# Patient Record
Sex: MALE | Race: WHITE | NOT HISPANIC OR LATINO | Employment: UNEMPLOYED | URBAN - METROPOLITAN AREA
[De-identification: names, ages, dates, MRNs, and addresses within clinical notes are randomized per-mention and may not be internally consistent; named-entity substitution may affect disease eponyms.]

---

## 2018-12-14 ENCOUNTER — HOSPITAL ENCOUNTER (INPATIENT)
Facility: HOSPITAL | Age: 23
LOS: 2 days | Discharge: LEFT BEFORE DISCHARGE | DRG: 286 | End: 2018-12-16
Attending: EMERGENCY MEDICINE | Admitting: INTERNAL MEDICINE

## 2018-12-14 DIAGNOSIS — F43.20 ADJUSTMENT DISORDER, UNSPECIFIED TYPE: ICD-10-CM

## 2018-12-14 DIAGNOSIS — R10.9 ABDOMINAL PAIN: ICD-10-CM

## 2018-12-14 DIAGNOSIS — I46.9 SUDDEN CARDIAC ARREST: ICD-10-CM

## 2018-12-14 DIAGNOSIS — Z98.890 REQUIRED EMERGENCY INTUBATION: ICD-10-CM

## 2018-12-14 DIAGNOSIS — I47.21 TORSADES DE POINTES: Primary | ICD-10-CM

## 2018-12-14 DIAGNOSIS — F12.10 CANNABIS ABUSE: Chronic | ICD-10-CM

## 2018-12-14 PROBLEM — R10.84 GENERALIZED ABDOMINAL PAIN: Status: ACTIVE | Noted: 2018-12-14

## 2018-12-14 PROBLEM — R73.9 HYPERGLYCEMIA: Status: ACTIVE | Noted: 2018-12-14

## 2018-12-14 PROBLEM — D72.829 LEUKOCYTOSIS: Status: ACTIVE | Noted: 2018-12-14

## 2018-12-14 PROBLEM — E87.20 LACTIC ACIDOSIS: Status: ACTIVE | Noted: 2018-12-14

## 2018-12-14 PROBLEM — J96.02 ACUTE RESPIRATORY FAILURE WITH HYPERCAPNIA: Status: ACTIVE | Noted: 2018-12-14

## 2018-12-14 LAB
ALBUMIN SERPL BCP-MCNC: 4.4 G/DL
ALBUMIN SERPL BCP-MCNC: 4.6 G/DL
ALLENS TEST: ABNORMAL
ALLENS TEST: ABNORMAL
ALP SERPL-CCNC: 120 U/L
ALP SERPL-CCNC: 122 U/L
ALT SERPL W/O P-5'-P-CCNC: 18 U/L
ALT SERPL W/O P-5'-P-CCNC: 78 U/L
AMPHET+METHAMPHET UR QL: NEGATIVE
AMPHET+METHAMPHET UR QL: NEGATIVE
ANION GAP SERPL CALC-SCNC: 10 MMOL/L
ANION GAP SERPL CALC-SCNC: 14 MMOL/L
ASCENDING AORTA: 2.28 CM
AST SERPL-CCNC: 20 U/L
AST SERPL-CCNC: 97 U/L
AV INDEX (PROSTH): 0.59
AV MEAN GRADIENT: 2.18 MMHG
AV PEAK GRADIENT: 3.92 MMHG
AV VALVE AREA: 2.44 CM2
BACTERIA #/AREA URNS AUTO: ABNORMAL /HPF
BARBITURATES UR QL SCN>200 NG/ML: NEGATIVE
BARBITURATES UR QL SCN>200 NG/ML: NEGATIVE
BASOPHILS # BLD AUTO: 0.09 K/UL
BASOPHILS NFR BLD: 0.5 %
BENZODIAZ UR QL SCN>200 NG/ML: NORMAL
BENZODIAZ UR QL SCN>200 NG/ML: NORMAL
BILIRUB SERPL-MCNC: 1 MG/DL
BILIRUB SERPL-MCNC: 1.2 MG/DL
BILIRUB UR QL STRIP: NEGATIVE
BSA FOR ECHO PROCEDURE: 1.89 M2
BUN SERPL-MCNC: 14 MG/DL
BUN SERPL-MCNC: 15 MG/DL
BUN SERPL-MCNC: 17 MG/DL (ref 6–30)
BUN SERPL-MCNC: 18 MG/DL (ref 6–30)
BZE UR QL SCN: NORMAL
BZE UR QL SCN: NORMAL
CALCIUM SERPL-MCNC: 10.3 MG/DL
CALCIUM SERPL-MCNC: 11 MG/DL
CANNABINOIDS UR QL SCN: NORMAL
CANNABINOIDS UR QL SCN: NORMAL
CHLORIDE SERPL-SCNC: 106 MMOL/L
CHLORIDE SERPL-SCNC: 107 MMOL/L (ref 95–110)
CHLORIDE SERPL-SCNC: 108 MMOL/L
CHLORIDE SERPL-SCNC: 111 MMOL/L (ref 95–110)
CLARITY UR REFRACT.AUTO: ABNORMAL
CO2 SERPL-SCNC: 21 MMOL/L
CO2 SERPL-SCNC: 23 MMOL/L
COLOR UR AUTO: YELLOW
CREAT SERPL-MCNC: 0.9 MG/DL (ref 0.5–1.4)
CREAT SERPL-MCNC: 1 MG/DL (ref 0.5–1.4)
CREAT SERPL-MCNC: 1.2 MG/DL
CREAT SERPL-MCNC: 1.2 MG/DL
CREAT UR-MCNC: 24 MG/DL
CREAT UR-MCNC: 24 MG/DL
CV ECHO LV RWT: 0.34 CM
DELSYS: ABNORMAL
DIFFERENTIAL METHOD: ABNORMAL
DOP CALC AO PEAK VEL: 0.99 M/S
DOP CALC AO VTI: 18.82 CM
DOP CALC LVOT AREA: 4.15 CM2
DOP CALC LVOT DIAMETER: 2.3 CM
DOP CALC LVOT STROKE VOLUME: 46.01 CM3
DOP CALCLVOT PEAK VEL VTI: 11.08 CM
E WAVE DECELERATION TIME: 192.28 MSEC
E/A RATIO: 1.59
E/E' RATIO: 6.56
ECHO LV POSTERIOR WALL: 0.95 CM (ref 0.6–1.1)
EOSINOPHIL # BLD AUTO: 0 K/UL
EOSINOPHIL NFR BLD: 0.1 %
ERYTHROCYTE [DISTWIDTH] IN BLOOD BY AUTOMATED COUNT: 13.2 %
ERYTHROCYTE [SEDIMENTATION RATE] IN BLOOD BY WESTERGREN METHOD: 14 MM/H
EST. GFR  (AFRICAN AMERICAN): >60 ML/MIN/1.73 M^2
EST. GFR  (AFRICAN AMERICAN): >60 ML/MIN/1.73 M^2
EST. GFR  (NON AFRICAN AMERICAN): >60 ML/MIN/1.73 M^2
EST. GFR  (NON AFRICAN AMERICAN): >60 ML/MIN/1.73 M^2
ETHANOL UR-MCNC: <10 MG/DL
FIO2: 50
FRACTIONAL SHORTENING: 13 % (ref 28–44)
GLUCOSE SERPL-MCNC: 121 MG/DL
GLUCOSE SERPL-MCNC: 185 MG/DL
GLUCOSE SERPL-MCNC: 193 MG/DL (ref 70–110)
GLUCOSE SERPL-MCNC: 295 MG/DL (ref 70–110)
GLUCOSE UR QL STRIP: ABNORMAL
HCO3 UR-SCNC: 10.5 MMOL/L (ref 24–28)
HCO3 UR-SCNC: 18.3 MMOL/L (ref 24–28)
HCT VFR BLD AUTO: 44.4 %
HCT VFR BLD CALC: 46 %PCV (ref 36–54)
HCT VFR BLD CALC: 49 %PCV (ref 36–54)
HCT VFR BLD CALC: 54 %PCV (ref 36–54)
HGB BLD-MCNC: 14.9 G/DL
HGB UR QL STRIP: ABNORMAL
HYALINE CASTS UR QL AUTO: 0 /LPF
IMM GRANULOCYTES # BLD AUTO: 0.08 K/UL
IMM GRANULOCYTES NFR BLD AUTO: 0.5 %
INTERVENTRICULAR SEPTUM: 0.92 CM (ref 0.6–1.1)
KETONES UR QL STRIP: ABNORMAL
LA MAJOR: 5.07 CM
LA MINOR: 4.84 CM
LA WIDTH: 3.56 CM
LACTATE SERPL-SCNC: 1 MMOL/L
LACTATE SERPL-SCNC: 3.2 MMOL/L
LDH SERPL L TO P-CCNC: 9.64 MMOL/L (ref 0.5–2.2)
LEFT ATRIUM SIZE: 2.45 CM
LEFT ATRIUM VOLUME INDEX: 19.3 ML/M2
LEFT ATRIUM VOLUME: 36.72 CM3
LEFT INTERNAL DIMENSION IN SYSTOLE: 4.86 CM (ref 2.1–4)
LEFT VENTRICLE DIASTOLIC VOLUME INDEX: 79.61 ML/M2
LEFT VENTRICLE DIASTOLIC VOLUME: 151.14 ML
LEFT VENTRICLE MASS INDEX: 104.7 G/M2
LEFT VENTRICLE SYSTOLIC VOLUME INDEX: 58.2 ML/M2
LEFT VENTRICLE SYSTOLIC VOLUME: 110.52 ML
LEFT VENTRICULAR INTERNAL DIMENSION IN DIASTOLE: 5.56 CM (ref 3.5–6)
LEFT VENTRICULAR MASS: 198.86 G
LEUKOCYTE ESTERASE UR QL STRIP: NEGATIVE
LIPASE SERPL-CCNC: 10 U/L
LV LATERAL E/E' RATIO: 5.36
LV SEPTAL E/E' RATIO: 8.43
LYMPHOCYTES # BLD AUTO: 1.4 K/UL
LYMPHOCYTES NFR BLD: 8.2 %
MAGNESIUM SERPL-MCNC: 1.7 MG/DL
MCH RBC QN AUTO: 28.7 PG
MCHC RBC AUTO-ENTMCNC: 33.6 G/DL
MCV RBC AUTO: 86 FL
METHADONE UR QL SCN>300 NG/ML: NEGATIVE
METHADONE UR QL SCN>300 NG/ML: NEGATIVE
MICROSCOPIC COMMENT: ABNORMAL
MODE: ABNORMAL
MONOCYTES # BLD AUTO: 1.1 K/UL
MONOCYTES NFR BLD: 6.2 %
MV PEAK A VEL: 0.37 M/S
MV PEAK E VEL: 0.59 M/S
NEUTROPHILS # BLD AUTO: 14.7 K/UL
NEUTROPHILS NFR BLD: 84.5 %
NITRITE UR QL STRIP: NEGATIVE
NRBC BLD-RTO: 0 /100 WBC
OPIATES UR QL SCN: NORMAL
OPIATES UR QL SCN: NORMAL
PCO2 BLDA: 17.3 MMHG (ref 35–45)
PCO2 BLDA: 44.5 MMHG (ref 35–45)
PCP UR QL SCN>25 NG/ML: NEGATIVE
PCP UR QL SCN>25 NG/ML: NEGATIVE
PEEP: 5
PH SMN: 7.22 [PH] (ref 7.35–7.45)
PH SMN: 7.39 [PH] (ref 7.35–7.45)
PH UR STRIP: 6 [PH] (ref 5–8)
PHOSPHATE SERPL-MCNC: 1.2 MG/DL
PISA TR MAX VEL: 1.58 M/S
PLATELET # BLD AUTO: 508 K/UL
PMV BLD AUTO: 9.5 FL
PO2 BLDA: 127 MMHG (ref 40–60)
PO2 BLDA: 397 MMHG (ref 80–100)
POC BE: -14 MMOL/L
POC BE: -9 MMOL/L
POC IONIZED CALCIUM: 0.94 MMOL/L (ref 1.06–1.42)
POC IONIZED CALCIUM: 1.48 MMOL/L (ref 1.06–1.42)
POC IONIZED CALCIUM: 1.94 MMOL/L (ref 1.06–1.42)
POC SATURATED O2: 100 % (ref 95–100)
POC SATURATED O2: 99 % (ref 95–100)
POC TCO2 (MEASURED): 14 MMOL/L (ref 23–29)
POC TCO2 (MEASURED): 20 MMOL/L (ref 23–29)
POC TCO2: 11 MMOL/L (ref 24–29)
POC TCO2: 20 MMOL/L (ref 23–27)
POCT GLUCOSE: 112 MG/DL (ref 70–110)
POCT GLUCOSE: 132 MG/DL (ref 70–110)
POTASSIUM BLD-SCNC: 3.7 MMOL/L (ref 3.5–5.1)
POTASSIUM BLD-SCNC: 4 MMOL/L (ref 3.5–5.1)
POTASSIUM BLD-SCNC: 6 MMOL/L (ref 3.5–5.1)
POTASSIUM SERPL-SCNC: 3.9 MMOL/L
POTASSIUM SERPL-SCNC: 4.4 MMOL/L
PROT SERPL-MCNC: 8.4 G/DL
PROT SERPL-MCNC: 8.8 G/DL
PROT UR QL STRIP: ABNORMAL
PULM VEIN S/D RATIO: 1.05
PV PEAK D VEL: 0.42 M/S
PV PEAK S VEL: 0.44 M/S
RA MAJOR: 4.38 CM
RA WIDTH: 3.62 CM
RBC # BLD AUTO: 5.19 M/UL
RBC #/AREA URNS AUTO: 4 /HPF (ref 0–4)
RIGHT VENTRICULAR END-DIASTOLIC DIMENSION: 3.12 CM
RV TISSUE DOPPLER FREE WALL SYSTOLIC VELOCITY 1 (APICAL 4 CHAMBER VIEW): 7.87 M/S
SAMPLE: ABNORMAL
SINUS: 3.01 CM
SITE: ABNORMAL
SITE: ABNORMAL
SODIUM BLD-SCNC: 139 MMOL/L (ref 136–145)
SODIUM BLD-SCNC: 142 MMOL/L (ref 136–145)
SODIUM BLD-SCNC: 143 MMOL/L (ref 136–145)
SODIUM SERPL-SCNC: 141 MMOL/L
SODIUM SERPL-SCNC: 141 MMOL/L
SP GR UR STRIP: 1.02 (ref 1–1.03)
SP02: 100
STJ: 2.57 CM
TDI LATERAL: 0.11
TDI SEPTAL: 0.07
TDI: 0.09
TOXICOLOGY INFORMATION: NORMAL
TOXICOLOGY INFORMATION: NORMAL
TR MAX PG: 9.99 MMHG
TRICUSPID ANNULAR PLANE SYSTOLIC EXCURSION: 1.37 CM
TROPONIN I SERPL DL<=0.01 NG/ML-MCNC: 0.25 NG/ML
TROPONIN I SERPL DL<=0.01 NG/ML-MCNC: 0.39 NG/ML
URN SPEC COLLECT METH UR: ABNORMAL
VT: 450
WBC # BLD AUTO: 17.38 K/UL
WBC #/AREA URNS AUTO: 1 /HPF (ref 0–5)
YEAST UR QL AUTO: ABNORMAL

## 2018-12-14 PROCEDURE — 51702 INSERT TEMP BLADDER CATH: CPT

## 2018-12-14 PROCEDURE — B2111ZZ FLUOROSCOPY OF MULTIPLE CORONARY ARTERIES USING LOW OSMOLAR CONTRAST: ICD-10-PCS | Performed by: INTERNAL MEDICINE

## 2018-12-14 PROCEDURE — C1769 GUIDE WIRE: HCPCS | Performed by: INTERNAL MEDICINE

## 2018-12-14 PROCEDURE — 84484 ASSAY OF TROPONIN QUANT: CPT

## 2018-12-14 PROCEDURE — 94761 N-INVAS EAR/PLS OXIMETRY MLT: CPT

## 2018-12-14 PROCEDURE — 99152 MOD SED SAME PHYS/QHP 5/>YRS: CPT | Performed by: INTERNAL MEDICINE

## 2018-12-14 PROCEDURE — 99152 MOD SED SAME PHYS/QHP 5/>YRS: CPT | Mod: ,,, | Performed by: INTERNAL MEDICINE

## 2018-12-14 PROCEDURE — 25000003 PHARM REV CODE 250: Performed by: INTERNAL MEDICINE

## 2018-12-14 PROCEDURE — 82803 BLOOD GASES ANY COMBINATION: CPT

## 2018-12-14 PROCEDURE — 25000003 PHARM REV CODE 250

## 2018-12-14 PROCEDURE — 63600175 PHARM REV CODE 636 W HCPCS: Performed by: EMERGENCY MEDICINE

## 2018-12-14 PROCEDURE — 63600175 PHARM REV CODE 636 W HCPCS

## 2018-12-14 PROCEDURE — 93458 L HRT ARTERY/VENTRICLE ANGIO: CPT | Mod: 26,,, | Performed by: INTERNAL MEDICINE

## 2018-12-14 PROCEDURE — C1760 CLOSURE DEV, VASC: HCPCS | Performed by: INTERNAL MEDICINE

## 2018-12-14 PROCEDURE — 25000003 PHARM REV CODE 250: Performed by: EMERGENCY MEDICINE

## 2018-12-14 PROCEDURE — 93005 ELECTROCARDIOGRAM TRACING: CPT

## 2018-12-14 PROCEDURE — 5A1945Z RESPIRATORY VENTILATION, 24-96 CONSECUTIVE HOURS: ICD-10-PCS | Performed by: INTERNAL MEDICINE

## 2018-12-14 PROCEDURE — 31500 INSERT EMERGENCY AIRWAY: CPT

## 2018-12-14 PROCEDURE — 36600 WITHDRAWAL OF ARTERIAL BLOOD: CPT

## 2018-12-14 PROCEDURE — 96365 THER/PROPH/DIAG IV INF INIT: CPT | Mod: 59

## 2018-12-14 PROCEDURE — 80053 COMPREHEN METABOLIC PANEL: CPT | Mod: 91

## 2018-12-14 PROCEDURE — 99291 CRITICAL CARE FIRST HOUR: CPT | Mod: 25,,, | Performed by: EMERGENCY MEDICINE

## 2018-12-14 PROCEDURE — 63600175 PHARM REV CODE 636 W HCPCS: Performed by: STUDENT IN AN ORGANIZED HEALTH CARE EDUCATION/TRAINING PROGRAM

## 2018-12-14 PROCEDURE — 85025 COMPLETE CBC W/AUTO DIFF WBC: CPT

## 2018-12-14 PROCEDURE — 82330 ASSAY OF CALCIUM: CPT

## 2018-12-14 PROCEDURE — 20000000 HC ICU ROOM

## 2018-12-14 PROCEDURE — 87040 BLOOD CULTURE FOR BACTERIA: CPT

## 2018-12-14 PROCEDURE — 4A023N7 MEASUREMENT OF CARDIAC SAMPLING AND PRESSURE, LEFT HEART, PERCUTANEOUS APPROACH: ICD-10-PCS | Performed by: INTERNAL MEDICINE

## 2018-12-14 PROCEDURE — 84484 ASSAY OF TROPONIN QUANT: CPT | Mod: 91

## 2018-12-14 PROCEDURE — 80307 DRUG TEST PRSMV CHEM ANLYZR: CPT

## 2018-12-14 PROCEDURE — 84295 ASSAY OF SERUM SODIUM: CPT

## 2018-12-14 PROCEDURE — 99291 CRITICAL CARE FIRST HOUR: CPT | Mod: 25

## 2018-12-14 PROCEDURE — 83690 ASSAY OF LIPASE: CPT

## 2018-12-14 PROCEDURE — B2151ZZ FLUOROSCOPY OF LEFT HEART USING LOW OSMOLAR CONTRAST: ICD-10-PCS | Performed by: INTERNAL MEDICINE

## 2018-12-14 PROCEDURE — 31500 INSERT EMERGENCY AIRWAY: CPT | Mod: ,,, | Performed by: EMERGENCY MEDICINE

## 2018-12-14 PROCEDURE — 51701 INSERT BLADDER CATHETER: CPT

## 2018-12-14 PROCEDURE — 93010 ELECTROCARDIOGRAM REPORT: CPT | Mod: 59,,, | Performed by: INTERNAL MEDICINE

## 2018-12-14 PROCEDURE — 5A2204Z RESTORATION OF CARDIAC RHYTHM, SINGLE: ICD-10-PCS | Performed by: EMERGENCY MEDICINE

## 2018-12-14 PROCEDURE — 96375 TX/PRO/DX INJ NEW DRUG ADDON: CPT | Mod: 59

## 2018-12-14 PROCEDURE — 82962 GLUCOSE BLOOD TEST: CPT

## 2018-12-14 PROCEDURE — 93458 L HRT ARTERY/VENTRICLE ANGIO: CPT | Performed by: INTERNAL MEDICINE

## 2018-12-14 PROCEDURE — 92960 CARDIOVERSION ELECTRIC EXT: CPT | Mod: ,,, | Performed by: EMERGENCY MEDICINE

## 2018-12-14 PROCEDURE — 83605 ASSAY OF LACTIC ACID: CPT

## 2018-12-14 PROCEDURE — 83605 ASSAY OF LACTIC ACID: CPT | Mod: 91

## 2018-12-14 PROCEDURE — 25000003 PHARM REV CODE 250: Performed by: STUDENT IN AN ORGANIZED HEALTH CARE EDUCATION/TRAINING PROGRAM

## 2018-12-14 PROCEDURE — 94002 VENT MGMT INPAT INIT DAY: CPT

## 2018-12-14 PROCEDURE — 27100171 HC OXYGEN HIGH FLOW UP TO 24 HOURS

## 2018-12-14 PROCEDURE — 99291 CRITICAL CARE FIRST HOUR: CPT | Mod: ,,, | Performed by: INTERNAL MEDICINE

## 2018-12-14 PROCEDURE — 80053 COMPREHEN METABOLIC PANEL: CPT

## 2018-12-14 PROCEDURE — 85014 HEMATOCRIT: CPT

## 2018-12-14 PROCEDURE — 63600175 PHARM REV CODE 636 W HCPCS: Performed by: INTERNAL MEDICINE

## 2018-12-14 PROCEDURE — 5A12012 PERFORMANCE OF CARDIAC OUTPUT, SINGLE, MANUAL: ICD-10-PCS | Performed by: EMERGENCY MEDICINE

## 2018-12-14 PROCEDURE — 81001 URINALYSIS AUTO W/SCOPE: CPT

## 2018-12-14 PROCEDURE — 83735 ASSAY OF MAGNESIUM: CPT

## 2018-12-14 PROCEDURE — 84132 ASSAY OF SERUM POTASSIUM: CPT

## 2018-12-14 PROCEDURE — C1894 INTRO/SHEATH, NON-LASER: HCPCS | Performed by: INTERNAL MEDICINE

## 2018-12-14 PROCEDURE — 99900035 HC TECH TIME PER 15 MIN (STAT)

## 2018-12-14 PROCEDURE — S0028 INJECTION, FAMOTIDINE, 20 MG: HCPCS | Performed by: STUDENT IN AN ORGANIZED HEALTH CARE EDUCATION/TRAINING PROGRAM

## 2018-12-14 PROCEDURE — 92960 CARDIOVERSION ELECTRIC EXT: CPT

## 2018-12-14 PROCEDURE — 0BH17EZ INSERTION OF ENDOTRACHEAL AIRWAY INTO TRACHEA, VIA NATURAL OR ARTIFICIAL OPENING: ICD-10-PCS | Performed by: EMERGENCY MEDICINE

## 2018-12-14 PROCEDURE — 84100 ASSAY OF PHOSPHORUS: CPT

## 2018-12-14 RX ORDER — VANCOMYCIN HCL IN 5 % DEXTROSE 1G/250ML
15 PLASTIC BAG, INJECTION (ML) INTRAVENOUS
Status: DISCONTINUED | OUTPATIENT
Start: 2018-12-15 | End: 2018-12-15

## 2018-12-14 RX ORDER — ETOMIDATE 2 MG/ML
INJECTION INTRAVENOUS
Status: COMPLETED
Start: 2018-12-14 | End: 2018-12-14

## 2018-12-14 RX ORDER — VANCOMYCIN 2 GRAM/500 ML IN 0.9 % SODIUM CHLORIDE INTRAVENOUS
2000
Status: COMPLETED | OUTPATIENT
Start: 2018-12-14 | End: 2018-12-14

## 2018-12-14 RX ORDER — LIDOCAINE HYDROCHLORIDE 20 MG/ML
INJECTION, SOLUTION INFILTRATION; PERINEURAL
Status: DISCONTINUED | OUTPATIENT
Start: 2018-12-14 | End: 2018-12-14 | Stop reason: HOSPADM

## 2018-12-14 RX ORDER — LORAZEPAM 2 MG/ML
2 INJECTION INTRAMUSCULAR ONCE
Status: COMPLETED | OUTPATIENT
Start: 2018-12-14 | End: 2018-12-15

## 2018-12-14 RX ORDER — CLOPIDOGREL 300 MG/1
600 TABLET, FILM COATED ORAL ONCE
Status: COMPLETED | OUTPATIENT
Start: 2018-12-14 | End: 2018-12-14

## 2018-12-14 RX ORDER — ASPIRIN 325 MG
TABLET ORAL
Status: COMPLETED
Start: 2018-12-14 | End: 2018-12-14

## 2018-12-14 RX ORDER — CALCIUM CHLORIDE INJECTION 100 MG/ML
1 INJECTION, SOLUTION INTRAVENOUS
Status: COMPLETED | OUTPATIENT
Start: 2018-12-14 | End: 2018-12-14

## 2018-12-14 RX ORDER — ASPIRIN 325 MG
325 TABLET ORAL ONCE
Status: COMPLETED | OUTPATIENT
Start: 2018-12-14 | End: 2018-12-14

## 2018-12-14 RX ORDER — MIDAZOLAM HYDROCHLORIDE 1 MG/ML
INJECTION, SOLUTION INTRAMUSCULAR; INTRAVENOUS
Status: DISCONTINUED | OUTPATIENT
Start: 2018-12-14 | End: 2018-12-14 | Stop reason: HOSPADM

## 2018-12-14 RX ORDER — FAMOTIDINE 10 MG/ML
20 INJECTION INTRAVENOUS 2 TIMES DAILY
Status: DISCONTINUED | OUTPATIENT
Start: 2018-12-14 | End: 2018-12-15

## 2018-12-14 RX ORDER — SODIUM CHLORIDE 0.9 G/100ML
IRRIGANT IRRIGATION
Status: DISCONTINUED | OUTPATIENT
Start: 2018-12-14 | End: 2018-12-14 | Stop reason: HOSPADM

## 2018-12-14 RX ORDER — ROCURONIUM BROMIDE 10 MG/ML
0.6 INJECTION, SOLUTION INTRAVENOUS
Status: COMPLETED | OUTPATIENT
Start: 2018-12-14 | End: 2018-12-14

## 2018-12-14 RX ORDER — ROCURONIUM BROMIDE 10 MG/ML
INJECTION, SOLUTION INTRAVENOUS
Status: DISPENSED
Start: 2018-12-14 | End: 2018-12-14

## 2018-12-14 RX ORDER — SODIUM CHLORIDE 9 MG/ML
INJECTION, SOLUTION INTRAVENOUS CONTINUOUS
Status: DISCONTINUED | OUTPATIENT
Start: 2018-12-14 | End: 2018-12-14

## 2018-12-14 RX ORDER — MAGNESIUM SULFATE HEPTAHYDRATE 40 MG/ML
2 INJECTION, SOLUTION INTRAVENOUS
Status: COMPLETED | OUTPATIENT
Start: 2018-12-14 | End: 2018-12-14

## 2018-12-14 RX ORDER — HEPARIN SODIUM 1000 [USP'U]/ML
INJECTION, SOLUTION INTRAVENOUS; SUBCUTANEOUS
Status: DISCONTINUED | OUTPATIENT
Start: 2018-12-14 | End: 2018-12-14 | Stop reason: HOSPADM

## 2018-12-14 RX ORDER — DIPHENHYDRAMINE HCL 50 MG
50 CAPSULE ORAL ONCE
Status: CANCELLED | OUTPATIENT
Start: 2018-12-14 | End: 2018-12-14

## 2018-12-14 RX ORDER — FENTANYL CITRATE 50 UG/ML
INJECTION, SOLUTION INTRAMUSCULAR; INTRAVENOUS
Status: DISCONTINUED | OUTPATIENT
Start: 2018-12-14 | End: 2018-12-14 | Stop reason: HOSPADM

## 2018-12-14 RX ORDER — ONDANSETRON 2 MG/ML
4 INJECTION INTRAMUSCULAR; INTRAVENOUS
Status: COMPLETED | OUTPATIENT
Start: 2018-12-14 | End: 2018-12-14

## 2018-12-14 RX ORDER — HEPARIN SODIUM 200 [USP'U]/100ML
INJECTION, SOLUTION INTRAVENOUS
Status: DISCONTINUED | OUTPATIENT
Start: 2018-12-14 | End: 2018-12-15

## 2018-12-14 RX ORDER — CEFEPIME HYDROCHLORIDE 2 G/1
2 INJECTION, POWDER, FOR SOLUTION INTRAVENOUS
Status: COMPLETED | OUTPATIENT
Start: 2018-12-14 | End: 2018-12-14

## 2018-12-14 RX ORDER — CLOPIDOGREL 300 MG/1
TABLET, FILM COATED ORAL
Status: COMPLETED
Start: 2018-12-14 | End: 2018-12-14

## 2018-12-14 RX ORDER — PROPOFOL 10 MG/ML
INJECTION, EMULSION INTRAVENOUS
Status: COMPLETED
Start: 2018-12-14 | End: 2018-12-14

## 2018-12-14 RX ORDER — LORAZEPAM 2 MG/ML
1 INJECTION INTRAMUSCULAR ONCE
Status: COMPLETED | OUTPATIENT
Start: 2018-12-14 | End: 2018-12-15

## 2018-12-14 RX ORDER — CHLORHEXIDINE GLUCONATE ORAL RINSE 1.2 MG/ML
15 SOLUTION DENTAL 2 TIMES DAILY
Status: DISCONTINUED | OUTPATIENT
Start: 2018-12-14 | End: 2018-12-16 | Stop reason: ALTCHOICE

## 2018-12-14 RX ORDER — SODIUM CHLORIDE 9 MG/ML
3 INJECTION, SOLUTION INTRAVENOUS CONTINUOUS
Status: CANCELLED | OUTPATIENT
Start: 2018-12-14 | End: 2018-12-14

## 2018-12-14 RX ORDER — NITROGLYCERIN 5 MG/ML
INJECTION, SOLUTION INTRAVENOUS
Status: DISCONTINUED | OUTPATIENT
Start: 2018-12-14 | End: 2018-12-14 | Stop reason: HOSPADM

## 2018-12-14 RX ORDER — AMIODARONE HYDROCHLORIDE 150 MG/3ML
300 INJECTION, SOLUTION INTRAVENOUS
Status: COMPLETED | OUTPATIENT
Start: 2018-12-14 | End: 2018-12-14

## 2018-12-14 RX ORDER — FENTANYL CITRATE-0.9 % NACL/PF 10 MCG/ML
PLASTIC BAG, INJECTION (ML) INTRAVENOUS CONTINUOUS
Status: DISCONTINUED | OUTPATIENT
Start: 2018-12-14 | End: 2018-12-15 | Stop reason: ALTCHOICE

## 2018-12-14 RX ORDER — DEXMEDETOMIDINE HYDROCHLORIDE 4 UG/ML
0.2 INJECTION, SOLUTION INTRAVENOUS CONTINUOUS
Status: DISCONTINUED | OUTPATIENT
Start: 2018-12-14 | End: 2018-12-15

## 2018-12-14 RX ORDER — PROPOFOL 10 MG/ML
10 INJECTION, EMULSION INTRAVENOUS CONTINUOUS
Status: DISCONTINUED | OUTPATIENT
Start: 2018-12-14 | End: 2018-12-15 | Stop reason: ALTCHOICE

## 2018-12-14 RX ADMIN — CLOPIDOGREL BISULFATE 600 MG: 300 TABLET, FILM COATED ORAL at 12:12

## 2018-12-14 RX ADMIN — PROPOFOL 50 MCG/KG/MIN: 10 INJECTION, EMULSION INTRAVENOUS at 04:12

## 2018-12-14 RX ADMIN — CHLORHEXIDINE GLUCONATE 15 ML: 1.2 RINSE ORAL at 10:12

## 2018-12-14 RX ADMIN — MAGNESIUM SULFATE IN WATER 2 G: 40 INJECTION, SOLUTION INTRAVENOUS at 11:12

## 2018-12-14 RX ADMIN — SODIUM CHLORIDE 3 ML/KG/HR: 0.9 INJECTION, SOLUTION INTRAVENOUS at 02:12

## 2018-12-14 RX ADMIN — PIPERACILLIN AND TAZOBACTAM 4.5 G: 4; .5 INJECTION, POWDER, LYOPHILIZED, FOR SOLUTION INTRAVENOUS; PARENTERAL at 10:12

## 2018-12-14 RX ADMIN — CEFEPIME 2 G: 2 INJECTION, POWDER, FOR SOLUTION INTRAVENOUS at 12:12

## 2018-12-14 RX ADMIN — MAGNESIUM SULFATE IN WATER 2 G: 40 INJECTION, SOLUTION INTRAVENOUS at 12:12

## 2018-12-14 RX ADMIN — CLOPIDOGREL BISULFATE: 300 TABLET, FILM COATED ORAL at 12:12

## 2018-12-14 RX ADMIN — ETOMIDATE 10 MG: 2 INJECTION INTRAVENOUS at 11:12

## 2018-12-14 RX ADMIN — CALCIUM CHLORIDE 1 G: 100 INJECTION, SOLUTION INTRAVENOUS at 11:12

## 2018-12-14 RX ADMIN — PIPERACILLIN AND TAZOBACTAM 4.5 G: 4; .5 INJECTION, POWDER, LYOPHILIZED, FOR SOLUTION INTRAVENOUS; PARENTERAL at 02:12

## 2018-12-14 RX ADMIN — VANCOMYCIN HYDROCHLORIDE 2000 MG: 10 INJECTION, POWDER, LYOPHILIZED, FOR SOLUTION INTRAVENOUS at 12:12

## 2018-12-14 RX ADMIN — AMIODARONE HYDROCHLORIDE 300 MG: 150 INJECTION, SOLUTION INTRAVENOUS at 11:12

## 2018-12-14 RX ADMIN — SODIUM CHLORIDE 1000 ML: 0.9 INJECTION, SOLUTION INTRAVENOUS at 11:12

## 2018-12-14 RX ADMIN — ASPIRIN 325 MG ORAL TABLET 325 MG: 325 PILL ORAL at 12:12

## 2018-12-14 RX ADMIN — Medication: at 12:12

## 2018-12-14 RX ADMIN — INSULIN HUMAN 10 UNITS: 100 INJECTION, SOLUTION PARENTERAL at 11:12

## 2018-12-14 RX ADMIN — SODIUM CHLORIDE, SODIUM LACTATE, POTASSIUM CHLORIDE, AND CALCIUM CHLORIDE 1000 ML: .6; .31; .03; .02 INJECTION, SOLUTION INTRAVENOUS at 11:12

## 2018-12-14 RX ADMIN — ONDANSETRON 4 MG: 2 INJECTION INTRAMUSCULAR; INTRAVENOUS at 10:12

## 2018-12-14 RX ADMIN — ROCURONIUM BROMIDE 44 MG: 10 INJECTION, SOLUTION INTRAVENOUS at 11:12

## 2018-12-14 RX ADMIN — DEXMEDETOMIDINE HYDROCHLORIDE 0.5 MCG/KG/HR: 100 INJECTION, SOLUTION, CONCENTRATE INTRAVENOUS at 04:12

## 2018-12-14 RX ADMIN — FAMOTIDINE 20 MG: 10 INJECTION INTRAVENOUS at 09:12

## 2018-12-14 RX ADMIN — PROPOFOL 50 MCG/KG/MIN: 10 INJECTION, EMULSION INTRAVENOUS at 08:12

## 2018-12-14 RX ADMIN — DEXTROSE MONOHYDRATE 25 G: 25 INJECTION, SOLUTION INTRAVENOUS at 11:12

## 2018-12-14 RX ADMIN — Medication 25 MCG/HR: at 06:12

## 2018-12-14 NOTE — H&P
"[facility]  Cardiology  History and Physical     Patient Name: Reggie To  MRN: [id number]  Admission Date: 12/14/2018  Code Status: No Order   Attending Provider: Polo Patel MD   Primary Care Physician: No primary care provider on file.  Principal Problem:Torsades de pointes    Patient information was obtained from spouse/SO, relative(s) and ER records.     Subjective:     Chief Complaint:  Abdominal pain     HPI:  Mr. To is a 23 year old male who initially presented to the ED with complaint of acute onset abdominal pain. Patient woke up at 6:30 AM today with severe abdominal pain and nausea. Per ED note, he had 20 episodes of vomiting and 10-15 episodes of diarrhea with dark red stool. At that time, patient reported 10/10 intensity pain described as generalized abdominal cramping. While patient was in triage, patient lost his conscious and he was pulseless. CPR was started, and he had ROSC after 15 minutes. He was intubated and sedated.     Patient was accompanied by his wife and family who provided the history. Per wife, patient's most recent meal was yesterday evening consisting of pizza. Last night around 11:30, he complained of his hands and legs feeling "numb". This morning, he vomited and had a fever of 104.1. He reported "don't feel too good" and asked his wife to call an ambulance. He has a history of PVCs since 2016, but his mother has reported that he was "born with it". He had been on medications for this, but this was stopped about 7-8 months ago. He also has a history of "bleeding ulcers" since 2015 due to EtOH use. This apparently happens "every 1-2 years". He is on pills for this. Wife states that patient has had ED visits in the past for similar symptoms and improvement with Zofran and GI cocktail. He was last hospitalized for this about 2 years ago. Wife also said that he is non-compliant with medications and does not like taking medications that are prescribed. " "Per aunt, he has been having "flu-like illness" for past few weeks. He smokes half a pack a of cigarettes per day for the past 4 years. He has not had recent EtOH use. He uses marijuana, but it is unknown how much and how often. Patient and family are originally from [place], and patient primarily receives his care there. Per family, patient is in [place] visiting family.    He received Zofran, IV fluids, and one dose of Vancomycin and Cefepime in the ED.  Interventional cardiology performed a [facility] which showed:  · TDP with cardiac arrest, CPR, ROSC, intubated.  · Normal coronaries  · Abdominal pain and fever, etiology yet determined.  · Estimated blood loss: none    Patient will be admitted to the CMICU for further evaluation and management.     History reviewed. No pertinent past medical history.    History reviewed. No pertinent surgical history.    Review of patient's allergies indicates:  No Known Allergies    No current facility-administered medications on file prior to encounter.      No current outpatient medications on file prior to encounter.     Family History     None        Tobacco Use    Smoking status: Current Every Day Smoker     Packs/day: 0.50     Types: Cigarettes    Smokeless tobacco: Never Used   Substance and Sexual Activity    Alcohol use: No     Frequency: Never    Drug use: No    Sexual activity: Not on file     Review of Systems   Unable to perform ROS: intubated     Objective:     Vital Signs (Most Recent):  Temp: 100.3 °F (37.9 °C) (12/14/18 1136)  Pulse: 67 (12/14/18 1522)  Resp: (!) 32 (12/14/18 1522)  BP: 131/70 (12/14/18 1522)  SpO2: 100 % (12/14/18 1522) Vital Signs (24h Range):  Temp:  [98.8 °F (37.1 °C)-100.3 °F (37.9 °C)] 100.3 °F (37.9 °C)  Pulse:  [67-97] 67  Resp:  [15-32] 32  SpO2:  [100 %] 100 %  BP: (131-143)/(70-75) 131/70     Weight: 72.6 kg (160 lb)  Body mass index is 22.96 kg/m².    SpO2: 100 %  O2 Device (Oxygen Therapy): ventilator    No intake or output data in " the 24 hours ending 12/14/18 1558    Lines/Drains/Airways     Drain                 NG/OG Tube 12/14/18 1149 [place] sump 18 Fr. Center mouth less than 1 day         Urethral Catheter 12/14/18 1131 [place] less than 1 day          Airway                 Airway - Non-Surgical 12/14/18 1128 Endotracheal Tube less than 1 day          Peripheral Intravenous Line                 Peripheral IV - Single Lumen 12/14/18 1051 Right Antecubital less than 1 day         Peripheral IV - Single Lumen 12/14/18 1130 Anterior;Distal;Left Antecubital less than 1 day         Peripheral IV - Single Lumen 12/14/18 1440 Left Antecubital less than 1 day                Physical Exam   Constitutional: He appears well-developed.   Eyes: Pupils are equal, round, and reactive to light.   Neck: Neck supple.   Cardiovascular: Normal rate and regular rhythm.   Pulmonary/Chest:   Intubated   Abdominal: Soft. He exhibits no distension.   Musculoskeletal: He exhibits no edema.   Neurological:   Sedated and intubated   Skin: Skin is warm.     Significant Labs:   ABG:   Recent Labs   Lab 12/14/18  1141 12/14/18  1211   PH 7.391 7.223*   PCO2 17.3* 44.5   HCO3 10.5* 18.3*   POCSATURATED 99 100   BE -14 -9   , Blood Culture: No results for input(s): LABBLOO in the last 48 hours., CMP   Recent Labs   Lab 12/14/18  1051      K 3.9      CO2 21*   *   BUN 14   CREATININE 1.2   CALCIUM 11.0*   PROT 8.8*   ALBUMIN 4.6   BILITOT 1.0   ALKPHOS 122   AST 20   ALT 18   ANIONGAP 14   ESTGFRAFRICA >60.0   EGFRNONAA >60.0   , CBC   Recent Labs   Lab 12/14/18  1051  12/14/18  1211   WBC 17.38*  --   --    HGB 14.9  --   --    HCT 44.4   < > 46   *  --   --     < > = values in this interval not displayed.     Significant Imaging:  [facility] (12/14/18):  ·  TDP with cardiac arrest, CPR, ROSC, intubated.  · Normal coronaries  · Abdominal pain and fever, etiology yet determined.  Estimated blood loss: none    X-Ray Abdomen (12/14/18):  - NG tube tip and  proximal port are below the GE junction; device could be advanced 10 cm to ensure that the proximal port remains below the GE junction.  - I detect no bowel distension, intramural gas, intra portal gas or free peritoneal gas.  Please see the report of the chest radiograph for further information.  - Overlying the medial aspect of the gastric fundus, projected over the proximal port of the NG tube, is a 5 mm irregularly marginated calcification.  Source is unclear.  This was also present in this location on the chest radiograph performed at the same time.  Clinical significance is unclear.  Location is too cephalad for renal calculus.  Please correlate with examination of the oral cavity to exclude chipped tooth with ingested fragment.    CXR (12/14/18):  ET tube is in place with its tip above the rodrigo at about the T4 level. NG tube is seen with its tip extending below the level of the diaphragm and not completely included in the field of view but likely in the proximal stomach.  Additional artifacts projected over the thorax.  Heart size and pulmonary vascularity are within normal limits.  Lungs are satisfactorily expanded and appear free of active disease.  No definite pleural fluid or pneumothorax.  Skeletal structures appear intact.    Assessment and Plan:     * Torsades de pointes    Patient initially presented with abdominal pain but went into PEA arrest and Torsade s/p DCCV and 15 minutes of CPR before ROSC. He received 4g of Magnesium, and a LHC ruled out CAD. He was intubated, sedated, and admitted to CCU.     Plan:  - Spoke with EP, who advised to obtain troponin, cardiac MRI and likely ICD placement for secondary prevention  - Monitor daily CMP/Mg with goal to keep Mg>2  - Follow up on 2D ECHO ordered  - EKGs PRN  - Urine drug screen test ordered  - When patient is awake and alert, will try to obtain further history, as well as try to obtain medical records from [place] regarding cardiac and medical  "history           Hyperglycemia    POCT peak at 132, likely stress induced. No history of diabetes per wife and family. Will continue POCT glucose checks 4x daily and adjust as needed.     Lactic acidosis    Please see "leukocytosis"     Leukocytosis    Patient with WBC 17.38 on admission and lactate of 3.2. Unclear source of etiology. BC ordered. CXR shows no acute process. UA in process. Started on vanc and zosyn for concern of infectious abdominal process.      Generalized abdominal pain    Patient initially presented with generalized abdominal pain with nausea and vomiting. He has a history of "bleeding ulcers". Abdominal x-ray showed no bowel distension, intramural gas, intra-portal gas or free peritoneal gas, however overlying the medial aspect of the gastric fundus is a 5 mm irregularly marginated calcification. Differentials include intra-abdominal infectious etiology vs perforated ulcer vs diverticulitis     Plan:  - Started on vancomycin and zosyn to cover for an infectious process  - CT Abdomen ordered to assess for any fluid or bleeding within abdomen         VTE Risk Mitigation (From admission, onward)        Ordered     heparin infusion 1,000 units/500 ml in 0.9% NaCl (pressure line flush)  Intra-op continuous PRN      12/14/18 1216        Case discussed with Cardiology fellow and staff, Dr. Patel. Please see attending attestation for any further addendums. Please contact CMICU with any questions.    Venus Christianson MD PGY1  Cardiology   [facility]    "

## 2018-12-14 NOTE — CONSULTS
[facility]  Interventional Cardiology  Consult Note    Patient Name: Reggie To  MRN: [id number]  Admission Date: 12/14/2018  Hospital Length of Stay: 0 days  Code Status: No Order   Attending Provider: No att. providers found  Consulting Provider: Ligia Olvera MD  Primary Care Physician: No primary care provider on file.  Principal Problem:<principal problem not specified>    Patient information was obtained from patient and ER records.     Inpatient consult to Cardiology  Consult performed by: Ligia Olvera MD  Consult ordered by: Sondra Ellington MD        Subjective:        HPI:  Mr To is a 23 y.o male presented to ED with diffuse abdominal pain, went into PEA arrest and Torsade s/p DCCV and 15 minute of CPR before ROSC. Cardiology consulted for further evaluation.    Informations obtained from patient wife and brother. Looks like he had abdominal pain for 1 day and flu like symptoms for 1-2 weeks. He has PMH of PUD and prior heavy alcohol abuse. Brother mentioned that patient smokes marihuana occasionally. No other significant history.    During triage patient lost his conscious and he was pulseless , CPR was started and he had ROSC after 15 minutes. He is intubated and received Vanc and Cefepime in ED.     History reviewed. No pertinent past medical history.    History reviewed. No pertinent surgical history.    Review of patient's allergies indicates:  No Known Allergies    No medications prior to admission.     Family History     None        Tobacco Use    Smoking status: Current Every Day Smoker     Packs/day: 0.50     Types: Cigarettes    Smokeless tobacco: Never Used   Substance and Sexual Activity    Alcohol use: No     Frequency: Never    Drug use: No    Sexual activity: Not on file     Review of Systems   All other systems reviewed and are negative.    Objective:     Vital Signs (Most Recent):  Temp: 100.3 °F (37.9 °C) (12/14/18 1136)  Pulse: 67 (12/14/18  1330)  Resp: (!) 24 (12/14/18 1330)  BP: (!) 143/75 (12/14/18 1027)  SpO2: 100 % (12/14/18 1330) Vital Signs (24h Range):  Temp:  [98.8 °F (37.1 °C)-100.3 °F (37.9 °C)] 100.3 °F (37.9 °C)  Pulse:  [67-97] 67  Resp:  [15-24] 24  SpO2:  [100 %] 100 %  BP: (143)/(75) 143/75     Weight: 72.6 kg (160 lb)  Body mass index is 22.96 kg/m².    SpO2: 100 %  O2 Device (Oxygen Therapy): ventilator    No intake or output data in the 24 hours ending 12/14/18 1408    Lines/Drains/Airways     Drain                 NG/OG Tube 12/14/18 1149 [place] sump 18 Fr. Center mouth less than 1 day         Urethral Catheter 12/14/18 1131 Latex less than 1 day          Airway                 Airway - Non-Surgical 12/14/18 1128 Endotracheal Tube less than 1 day          Peripheral Intravenous Line                 Peripheral IV - Single Lumen 12/14/18 1051 Right Antecubital less than 1 day         Peripheral IV - Single Lumen 12/14/18 1130 Anterior;Distal;Left Antecubital less than 1 day                Physical Exam   Constitutional: He is oriented to person, place, and time. He appears well-developed.   Eyes: Pupils are equal, round, and reactive to light.   Neck: Normal range of motion.   Pulmonary/Chest: Effort normal.   Abdominal: Soft.   Neurological: He is alert and oriented to person, place, and time.   Skin: Skin is warm.       Significant Labs:   CMP   Recent Labs   Lab 12/14/18  1051      K 3.9      CO2 21*   *   BUN 14   CREATININE 1.2   CALCIUM 11.0*   PROT 8.8*   ALBUMIN 4.6   BILITOT 1.0   ALKPHOS 122   AST 20   ALT 18   ANIONGAP 14   ESTGFRAFRICA >60.0   EGFRNONAA >60.0   , CBC   Recent Labs   Lab 12/14/18  1051  12/14/18  1211   WBC 17.38*  --   --    HGB 14.9  --   --    HCT 44.4   < > 46   *  --   --     < > = values in this interval not displayed.    and INR No results for input(s): INR, PROTIME in the last 48 hours.        Assessment and Plan:     Torsades de pointes    - Will r/o CAD.  - [facility] via R CFA  access.  - The risks, benefits & alternatives of the procedure were explained to the patient's wife.   - The risks of coronary angiography include but are not limited to: Bleeding, infection, heart rhythm abnormalities, allergic reactions, kidney injury requiring dilaysis, stroke and death.   - Should stenting be indicated, the patient has agreed to dual anti-platelet therapy for 1-consecutive year with a drug-eluting stent.  - The risks of moderate sedation include hypotension, respiratory depression, arrhythmias, bronchospasm, & death.   - Informed consent was obtained & the patient's wife is agreeable to proceed with the procedure.           Thank you for your consult, please call cariology for any question.     Ligia Olvera MD  Cardiology Fellow  Pager: [phone]

## 2018-12-14 NOTE — ASSESSMENT & PLAN NOTE
- Will r/o CAD.  - LHC via R CFA access.  - The risks, benefits & alternatives of the procedure were explained to the patient's wife.   - The risks of coronary angiography include but are not limited to: Bleeding, infection, heart rhythm abnormalities, allergic reactions, kidney injury requiring dilaysis, stroke and death.   - Should stenting be indicated, the patient has agreed to dual anti-platelet therapy for 1-consecutive year with a drug-eluting stent.  - The risks of moderate sedation include hypotension, respiratory depression, arrhythmias, bronchospasm, & death.   - Informed consent was obtained & the patient's wife is agreeable to proceed with the procedure.

## 2018-12-14 NOTE — CHAPLAIN
Wife Ranjana is very emotional. Ranjana has a 2yr old baby and had a miscarriage just a few days ago so she is at the end of her emotional rope.    The extended family is very intense.

## 2018-12-14 NOTE — ED NOTES
LOC: The patient is awake, alert, and aware of environment. The patient is oriented x 3 and speaking appropriately.   APPEARANCE: No acute distress noted.   PSYCHOSOCIAL: Patient is calm and cooperative.   SKIN: The skin is warm, dry.   RESPIRATORY: Airway is open and patent. Bilateral chest rise and fall. Respirations are spontaneous, even and unlabored. Normal effort and rate noted. No accessory muscle use noted.   CARDIAC: Patient has a normal rate and rhythm. Denies chest pain or SOB.   ABDOMEN: Soft and very tender upon palpation. No distention noted.   URINARY:  Voids independently.   EXTREMITIES: WNL  NEUROLOGIC: Eyes open spontaneously. Speech clear. Tolerating saliva secretions well. Able to follow commands, demonstrating ability to actively and appropriately communicate within context of current conversation. Symmetrical facial muscles. Moving all extremities well. Movement is purposeful.   MUSCULOSKELETAL: No obvious deformities noted.

## 2018-12-14 NOTE — ASSESSMENT & PLAN NOTE
Patient with WBC 17.38 on admission and lactate of 3.2. Unclear source of etiology. BC ordered. CXR shows no acute process. UA in process. Started on vanc and zosyn for concern of infectious abdominal process.

## 2018-12-14 NOTE — PROGRESS NOTES
Pt arrived intubated ETT on transport ventilator from ER . Placed pt on ventilator on AC rate 14  peep 5 50%o2. Will continue to monitor pt

## 2018-12-14 NOTE — ED PROVIDER NOTES
Encounter Date: 12/14/2018    SCRIBE #1 NOTE: Sixto LOJA, am scribing for, and in the presence of, Dr. Ellington.       History     Chief Complaint   Patient presents with    Abdominal Pain     nvd since last night, nved     Time seen by provider: 10:38 AM    This is a 23 y.o. male who presents to the ED with complaint of acute onset abdominal pain. Patient is accompanied by his wife who is providing the history. Patient woke up at 6:30 AM today with severe abdominal pain and nausea. He has since had 20 episodes of vomiting and 10-15 episodes of diarrhea with dark red stool. Currently patient reports 10/10 intensity pain described as generalized abdominal cramping.   Per wife, patient's most recent meal was yesterday evening consisting of pizza.  He has a history of stomach ulcers since 2015 due to EtOH use. He also has past history of kidney stones. Wife states that patient has had ED visits in the past for similar symptoms and imrpovement with Zofran and GI cocktail. No sick contacts. No recent EtOH use.       The history is provided by the patient and a significant other.     Review of patient's allergies indicates:  Allergies not on file  History reviewed. No pertinent past medical history.  History reviewed. No pertinent surgical history.  History reviewed. No pertinent family history.  Social History     Tobacco Use    Smoking status: Current Every Day Smoker     Packs/day: 0.50     Types: Cigarettes    Smokeless tobacco: Never Used   Substance Use Topics    Alcohol use: No     Frequency: Never    Drug use: No     Review of Systems   Constitutional: Negative for fever.   HENT: Negative for sore throat.    Respiratory: Negative for shortness of breath.    Cardiovascular: Negative for chest pain.   Gastrointestinal: Positive for abdominal pain, blood in stool, diarrhea, nausea and vomiting.   Genitourinary: Negative for dysuria.   Musculoskeletal: Negative for back pain.   Skin: Negative for rash.    Neurological: Negative for weakness.   Hematological: Does not bruise/bleed easily.       Physical Exam     Initial Vitals [12/14/18 1027]   BP Pulse Resp Temp SpO2   (!) 143/75 97 15 98.8 °F (37.1 °C) 100 %      MAP       --         Physical Exam    Nursing note and vitals reviewed.  Constitutional: He appears well-developed and well-nourished. He is not diaphoretic. He appears distressed.   Mildly diaphoretic.    HENT:   Head: Normocephalic and atraumatic.   Mouth/Throat: Oropharynx is clear and moist.   Mucous membrane are dry.    Neck: Normal range of motion. Neck supple. No JVD present.   Cardiovascular: Normal rate, regular rhythm, normal heart sounds and intact distal pulses.   Pulmonary/Chest: Breath sounds normal. No respiratory distress. He has no wheezes. He has no rhonchi. He has no rales.   Abdominal: Soft. He exhibits no distension. There is tenderness. There is no rebound and no guarding.   Diffuse abdominal tenderness.    Musculoskeletal: Normal range of motion. He exhibits no edema or tenderness.   Neurological: He is alert and oriented to person, place, and time. He has normal strength. No cranial nerve deficit or sensory deficit.   Skin: Skin is warm and dry. There is pallor.         ED Course   Intubation  Date/Time: 12/14/2018 11:52 AM  Location procedure was performed: [facility] EMERGENCY DEPARTMENT  Performed by: Sebastián Rodney MD  Authorized by: Sondra Ellington MD   Consent Done: Emergent Situation  Indications: airway protection  Intubation method: direct  Patient status: paralyzed (RSI)  Preoxygenation: bag valve mask  Sedatives: etomidate  Paralytic: rocuronium  Laryngoscope size: Mac 4  Tube size: 7.5 mm  Tube type: cuffed  Number of attempts: 1  Cricoid pressure: yes  Cords visualized: yes  Post-procedure assessment: chest rise and CO2 detector  Breath sounds: clear and equal and absent over the epigastrium  Cuff inflated: yes  ETT to lip: 25 cm  Tube secured with: adhesive  tape and ETT hall  Chest x-ray interpreted by me and other physician.  Chest x-ray findings: endotracheal tube in appropriate position  Patient tolerance: Patient tolerated the procedure well with no immediate complications  Complications: No  Estimated blood loss (mL): 0  Specimens: No  Comments: Patient had small abrasions/superficial lac on upper and lower lips prior to RSI    Critical Care  Date/Time: 12/14/2018 2:14 PM  Performed by: Sondra Ellington MD  Authorized by: Sondra Ellington MD   Total critical care time (exclusive of procedural time) : 45 minutes  Critical care time was exclusive of separately billable procedures and treating other patients.  Critical care was necessary to treat or prevent imminent or life-threatening deterioration of the following conditions: cardiac failure, respiratory failure and dehydration.  Critical care was time spent personally by me on the following activities: discussions with primary provider, re-evaluation of patient's condition, review of old charts, ordering and review of laboratory studies, obtaining history from patient or surrogate, gastric intubation, discussions with consultants, evaluation of patient's response to treatment, examination of patient, ordering and review of radiographic studies, ordering and performing treatments and interventions and pulse oximetry.    Cardioversion  Date/Time: 12/14/2018 2:15 PM  Location procedure was performed: [facility] EMERGENCY DEPARTMENT  Performed by: Sondra Ellington MD  Authorized by: Sondra Ellington MD   Consent Done: Emergent Situation  Cardioversion basis: emergent  Pre-procedure rhythm: torsades de points  Patient position: patient was placed in a supine position  Chest area: chest area exposed  Electrodes: pads  Electrodes placed: anterior-lateral  Number of attempts: 3  Attempt 1 mode: asynchronous  Attempt 1 shock (in Joules): 360  Attempt 1 outcome: no change in rhythm  Attempt 2 mode: asynchronous  Attempt 2 shock (in  Joules): 360  Attempt 2 outcome: no change in rhythm  Attempt 3 mode: asynchronous  Attempt 3 shock (in Joules): 360  Attempt 3 outcome: no change in rhythm  Description of findings: magnesium given   Post-procedure rhythm: sinus tachycardia        Labs Reviewed   CBC W/ AUTO DIFFERENTIAL - Abnormal; Notable for the following components:       Result Value    WBC 17.38 (*)     Platelets 508 (*)     Gran # (ANC) 14.7 (*)     Immature Grans (Abs) 0.08 (*)     Mono # 1.1 (*)     Gran% 84.5 (*)     Lymph% 8.2 (*)     All other components within normal limits   COMPREHENSIVE METABOLIC PANEL - Abnormal; Notable for the following components:    CO2 21 (*)     Glucose 185 (*)     Calcium 11.0 (*)     Total Protein 8.8 (*)     All other components within normal limits   LACTIC ACID, PLASMA - Abnormal; Notable for the following components:    Lactate (Lactic Acid) 3.2 (*)     All other components within normal limits   POCT GLUCOSE - Abnormal; Notable for the following components:    POCT Glucose 132 (*)     All other components within normal limits   ISTAT PROCEDURE - Abnormal; Notable for the following components:    POC Glucose 193 (*)     POC Potassium 6.0 (*)     POC Chloride 111 (*)     POC TCO2 (MEASURED) 14 (*)     POC Ionized Calcium 0.94 (*)     All other components within normal limits   ISTAT PROCEDURE - Abnormal; Notable for the following components:    POC PCO2 17.3 (*)     POC PO2 127 (*)     POC HCO3 10.5 (*)     POC Lactate 9.64 (*)     POC TCO2 11 (*)     All other components within normal limits   ISTAT PROCEDURE - Abnormal; Notable for the following components:    POC Glucose 295 (*)     POC TCO2 (MEASURED) 20 (*)     POC Ionized Calcium 1.94 (*)     All other components within normal limits   CULTURE, BLOOD   CULTURE, BLOOD   LIPASE   MAGNESIUM   PHOSPHORUS   MAGNESIUM   URINALYSIS, REFLEX TO URINE CULTURE   DRUG SCREEN PANEL, URINE EMERGENCY   URINALYSIS, REFLEX TO URINE CULTURE   POCT OCCULT BLOOD STOOL    ISTAT CHEM8     EKG Readings: (Independently Interpreted)   Heart Rate: 117.   EKG: ST at 117, nonspecific intraventricular condition delay       Imaging Results          X-Ray Chest AP Portable (Final result)  Result time 12/14/18 12:13:40    Final result by Charles Oviedo MD (12/14/18 12:13:40)                 Impression:      ET tube tip T4 level.  NG tube tip proximal stomach.  No active cardiopulmonary disease.      Electronically signed by: Charles Oviedo MD  Date:    12/14/2018  Time:    12:13             Narrative:    EXAMINATION:  XR CHEST AP PORTABLE    CLINICAL HISTORY:  Other specified postprocedural states    TECHNIQUE:  Single frontal portable view of the chest was performed.    COMPARISON:  None    FINDINGS:  ET tube is in place with its tip above the rodrigo at about the T4 level.  NG tube is seen with its tip extending below the level of the diaphragm and not completely included in the field of view but likely in the proximal stomach.  Additional artifacts projected over the thorax.  Heart size and pulmonary vascularity are within normal limits.  Lungs are satisfactorily expanded and appear free of active disease.  No definite pleural fluid or pneumothorax.  Skeletal structures appear intact.                               X-Ray Abdomen Portable (Final result)  Result time 12/14/18 12:21:09    Final result by Maura Barnard MD (12/14/18 12:21:09)                 Impression:      Please see above.      Electronically signed by: Maura Barnard MD  Date:    12/14/2018  Time:    12:21             Narrative:    EXAMINATION:  XR ABDOMEN PORTABLE    CLINICAL HISTORY:  Unspecified abdominal pain    TECHNIQUE:  Abdominal radiograph centered at the xiphoid.  Abdominal radiograph centered at the xiphoid.    COMPARISON:  Chest radiograph: 12/14/2018, 11:54 hours.    FINDINGS:  Numerous EKG leads and other extrinsic devices overlie the image obscuring detail.    NG tube tip and proximal port are below the GE  junction; device could be advanced 10 cm to ensure that the proximal port remains below the GE junction.    I detect no bowel distension, intramural gas, intra portal gas or free peritoneal gas.  Please see the report of the chest radiograph for further information.    Overlying the medial aspect of the gastric fundus, projected over the proximal port of the NG tube, is a 5 mm irregularly marginated calcification.  Source is unclear.  This was also present in this location on the chest radiograph performed at the same time.  Clinical significance is unclear.  Location is too cephalad for renal calculus.  Please correlate with examination of the oral cavity to exclude chipped tooth with ingested fragment.                                 Medical Decision Making:   History:   Old Medical Records: I decided to obtain old medical records.  Initial Assessment:   Emergent evaluation of 23 y.o. male with nausea, vomiting, diarrhea, who appears acutely distressed although vital signs are stable.   Differential Diagnosis:   Differential includes gastroenteritis, electrolyte derangement, VERA.   Clinical Tests:   Lab Tests: Ordered and Reviewed  Radiological Study: Ordered and Reviewed  Medical Tests: Ordered and Reviewed  ED Management:  IV fluids, IV zofran, labs, closely monitor.     11:07   Notified by nurse that patient was having seizure like activity.  I immediately assessed the patient in the recliner- pt had posturing with tonic clonic movements, +urinated, + bite tongue, + diaphoretic,+ perioral cyanosis. Pt had irregular breathing assisted immediately with ambu bag.  Pulses initially intact.  Pt then began having agonal breathing, pt assisted to floor, pulse check with NO pulse- CPR started immediately. Charge nurse notified.  Pt moved to resuscitation room 1.  Placed on cardiac monitoring- torsades.  Magnesium ordered- none in code cart, waiting for pharmacy to verify.  Nurse called to explain emergent need.  Pt  meanwhile given amiodarione 300 mg IV and defibrillated 3 times without success. Mag given on arrival over 2 mins- + pulse and ST on monitor.      Cardiology at bedside.  EKG reviewed- ST at 117 with intraventricular conduction delay.  Pt intubated for airway protection and expected clinical coarse.  Cardiology recommending emergent cardiac cath.  Istat with hyperkalemia- shifted with calcium chloride 1 g, dextrose, insulin.  Rectal temp now 100.3.  Lactate 3.2.  Vanc and cefepime ordered by cards.  CXR reviewed- ET tube above rodrigo.  NG tube in place.  Pt transported to cath lab on cardiac monitor with nurse, cardiology, and family.             Scribe Attestation:   Scribe #1: I performed the above scribed service and the documentation accurately describes the services I performed. I attest to the accuracy of the note.    Attending Attestation:         Attending Critical Care:   Critical Care Times:   ==============================================================  · Total Critical Care Time - exclusive of procedural time: 45 minutes.  ==============================================================    Physician Attestation for Scribe:      Comments: I, Dr. Sondra Ellington, personally performed the services described in this documentation. All medical record entries made by the scribe were at my direction and in my presence.  I have reviewed the chart and agree that the record reflects my personal performance and is accurate and complete. Sondra Ellington MD.            HO3  Patient was brought immediately from another area of the emergency department where patient was being evaluated and managed to my area, Room 1, after patient was found to be unresponsive, at this time I joined joined the team for care of this patient - patient was found to be in Torsades, 2 grams of magnesium ordered immediately, patient was coded per ACLS protocol with 3 defibrillations delivered, chest compressions, and bvm - patient regained pulses  and sinus rhythm, patient had a GCS 3, unresponsive and not withdrawing from painful stimuli, decision made to intubate patient for definite airway and airway protection, etomidate used for sedation, rocuronium used 2/2 patient's possible electrolyte disturbances.  Cormack Lehane grade 1 view obtained - viewed ET tube through cords without complication, good color change and bilateral breath sounds.   Cardiology emergently consulted, they evaluated patient immediately at bedside, patient continues to be hemodynamically stable post arrest, continues to be in sinus rhythm. Currently confirming Tube placement - NG to be advanced 10cm and ET to be advanced 1.5cm.   EKG shows elevations in the precordial leads, to be taken to cath lab.   Sebastián Rodney MD PGY3  12/14/2018 12:06 PM           Clinical Impression:   The primary encounter diagnosis was Torsades de pointes. Diagnoses of Required emergency intubation and Abdominal pain were also pertinent to this visit.      Disposition:   Disposition: Admitted  Condition: Critical                        Sondra Ellington MD  12/14/18 [id number]

## 2018-12-14 NOTE — HPI
"Mr. To is a 23 year old male who initially presented to the ED with complaint of acute onset abdominal pain. Patient woke up at 6:30 AM today with severe abdominal pain and nausea. Per ED note, he had 20 episodes of vomiting and 10-15 episodes of diarrhea with dark red stool. At that time, patient reported 10/10 intensity pain described as generalized abdominal cramping. While patient was in triage, patient lost his conscious and he was pulseless. CPR was started, and he had ROSC after 15 minutes. He was intubated and sedated.     Patient was accompanied by his wife and family who provided the history. Per wife, patient's most recent meal was yesterday evening consisting of pizza. Last night around 11:30, he complained of his hands and legs feeling "numb". This morning, he vomited and had a fever of 104.1. He reported "don't feel too good" and asked his wife to call an ambulance. He has a history of PVCs since 2016, but his mother has reported that he was "born with it". He had been on medications for this, but this was stopped about 7-8 months ago. He also has a history of "bleeding ulcers" since 2015 due to EtOH use. This apparently happens "every 1-2 years". He is on pills for this. Wife states that patient has had ED visits in the past for similar symptoms and improvement with Zofran and GI cocktail. He was last hospitalized for this about 2 years ago. Wife also said that he is non-compliant with medications and does not like taking medications that are prescribed. Per aunt, he has been having "flu-like illness" for past few weeks. He smokes half a pack a of cigarettes per day for the past 4 years. He has not had recent EtOH use. He uses marijuana, but it is unknown how much and how often. Patient and family are originally from [place], and patient primarily receives his care there. Per family, patient is in [place] visiting family.    He received Zofran, IV fluids, and one dose of Vancomycin and Cefepime " in the ED. Interventional cardiology performed a LHC which showed:  · TDP with cardiac arrest, CPR, ROSC, intubated.  · Normal coronaries  · Abdominal pain and fever, etiology yet determined.  · Estimated blood loss: none    Patient was admitted to the [facility]ICU for further evaluation and management.

## 2018-12-14 NOTE — PROCEDURES
"    Post Cath Note  Referring Physician: No att. providers found  Procedure: Left heart cath (Left)       Access: Right CFA    See full report for further details    Intervention:   Findings: normal coronaries  Closure device: Perclosure    Post Cath Exam:   BP (!) 143/75   Pulse 97   Temp 100.3 °F (37.9 °C) (Rectal)   Resp 15   Ht 5' 10" (1.778 m)   Wt 72.6 kg (160 lb)   SpO2 100%   BMI 22.96 kg/m²   No unusual pain, hematoma, thrill or bruit at vascular access site.  Distal pulse present without signs of ischemia.    Recommendations:   - Routine post-cath care  - IVF at 3 cc/kg/hr for 4 hrs  - Continue medical management of cardiac arrest  - Pt moving extremities, no indication for cooling    Signed:  Guido Gary MD  Cardiology Fellow  Pager [phone]          "

## 2018-12-14 NOTE — PROCEDURES
"    Post Cath Note  Referring Physician: No att. providers found  Procedure: Left heart cath (Left)       Access: Right CFA  Normal Coronaries  LVEDP 20 mmHg    See full report for further details    Intervention:   none  Closure device: Perclosure    Post Cath Exam:   BP (!) 143/75   Pulse 97   Temp 100.3 °F (37.9 °C) (Rectal)   Resp 15   Ht 5' 10" (1.778 m)   Wt 72.6 kg (160 lb)   SpO2 100%   BMI 22.96 kg/m²   No unusual pain, hematoma, thrill or bruit at vascular access site.  Distal pulse present without signs of ischemia.    Recommendations:   - Routine post-cath care  - IVF at 125 cc/hr for 4 hrs  - Continue medical management, Risk factor reduction      Signed:  Hood Gonzalez MD  [facility]   12/14/2018 1:01 PM  "

## 2018-12-14 NOTE — ED TRIAGE NOTES
"Pt reports lower abd pain, nausea, vomiting, diarrhea that began at approx 4 hr pta; pt denies fevers; pt's wife endorses "bright red blood" in stool; pt describes pain as "cramping"; pt A&Ox4, curled up in fetal position throughout assessment; generalized abd tenderness upon palpation  "

## 2018-12-14 NOTE — Clinical Note
Catheter is repositioned to the ostium right coronary artery. The vessel(s) were injected and visualized.

## 2018-12-14 NOTE — ASSESSMENT & PLAN NOTE
"Patient initially presented with generalized abdominal pain with nausea and vomiting. He has a history of "bleeding ulcers". Abdominal x-ray showed no bowel distension, intramural gas, intra-portal gas or free peritoneal gas, however overlying the medial aspect of the gastric fundus is a 5 mm irregularly marginated calcification. Differentials include intra-abdominal infectious etiology vs perforated ulcer vs diverticulitis     Plan:  - Started on vancomycin and zosyn to cover for an infectious process  - CT Abdomen ordered to assess for any fluid or bleeding within abdomen  "

## 2018-12-14 NOTE — ASSESSMENT & PLAN NOTE
Patient initially presented with abdominal pain but went into PEA arrest and Torsade s/p DCCV and 15 minutes of CPR before ROSC. He received 4g of Magnesium, and a [facility] ruled out CAD. He was intubated, sedated, and admitted to CCU.     Plan:  - Spoke with EP, who advised to obtain troponin, cardiac MRI and likely ICD placement for secondary prevention  - Monitor daily CMP/Mg with goal to keep Mg>2  - Follow up on 2D ECHO ordered  - EKGs PRN  - Urine drug screen test ordered  - When patient is awake and alert, will try to obtain further history, as well as try to obtain medical records from [place] regarding cardiac and medical history

## 2018-12-14 NOTE — ASSESSMENT & PLAN NOTE
POCT peak at 132, likely stress induced. No history of diabetes per wife and family. Will continue POCT glucose checks 4x daily and adjust as needed.

## 2018-12-14 NOTE — HPI
Mr To is a 23 y.o male presented to ED with diffuse abdominal pain, went into PEA arrest and Torsade s/p DCCV and 15 minute of CPR before ROSC. Cardiology consulted for further evaluation.    Informations obtained from patient wife and brother. Looks like he had abdominal pain for 1 day and flu like symptoms for 1-2 weeks. He has PMH of PUD and prior heavy alcohol abuse. Brother mentioned that patient smokes marihuana occasionally. No other significant history.    During triage patient lost his conscious and he was pulseless , CPR was started and he had ROSC after 15 minutes. He is intubated and received Vanc and Cefepime in ED.

## 2018-12-14 NOTE — SUBJECTIVE & OBJECTIVE
History reviewed. No pertinent past medical history.    History reviewed. No pertinent surgical history.    Review of patient's allergies indicates:  No Known Allergies    No medications prior to admission.     Family History     None        Tobacco Use    Smoking status: Current Every Day Smoker     Packs/day: 0.50     Types: Cigarettes    Smokeless tobacco: Never Used   Substance and Sexual Activity    Alcohol use: No     Frequency: Never    Drug use: No    Sexual activity: Not on file     Review of Systems   All other systems reviewed and are negative.    Objective:     Vital Signs (Most Recent):  Temp: 100.3 °F (37.9 °C) (12/14/18 1136)  Pulse: 67 (12/14/18 1330)  Resp: (!) 24 (12/14/18 1330)  BP: (!) 143/75 (12/14/18 1027)  SpO2: 100 % (12/14/18 1330) Vital Signs (24h Range):  Temp:  [98.8 °F (37.1 °C)-100.3 °F (37.9 °C)] 100.3 °F (37.9 °C)  Pulse:  [67-97] 67  Resp:  [15-24] 24  SpO2:  [100 %] 100 %  BP: (143)/(75) 143/75     Weight: 72.6 kg (160 lb)  Body mass index is 22.96 kg/m².    SpO2: 100 %  O2 Device (Oxygen Therapy): ventilator    No intake or output data in the 24 hours ending 12/14/18 1408    Lines/Drains/Airways     Drain                 NG/OG Tube 12/14/18 1149 [place] sump 18 Fr. Center mouth less than 1 day         Urethral Catheter 12/14/18 1131 [place] less than 1 day          Airway                 Airway - Non-Surgical 12/14/18 1128 Endotracheal Tube less than 1 day          Peripheral Intravenous Line                 Peripheral IV - Single Lumen 12/14/18 1051 Right Antecubital less than 1 day         Peripheral IV - Single Lumen 12/14/18 1130 Anterior;Distal;Left Antecubital less than 1 day                Physical Exam   Constitutional: He is oriented to person, place, and time. He appears well-developed.   Eyes: Pupils are equal, round, and reactive to light.   Neck: Normal range of motion.   Pulmonary/Chest: Effort normal.   Abdominal: Soft.   Neurological: He is alert and oriented to  person, place, and time.   Skin: Skin is warm.       Significant Labs:   CMP   Recent Labs   Lab 12/14/18  1051      K 3.9      CO2 21*   *   BUN 14   CREATININE 1.2   CALCIUM 11.0*   PROT 8.8*   ALBUMIN 4.6   BILITOT 1.0   ALKPHOS 122   AST 20   ALT 18   ANIONGAP 14   ESTGFRAFRICA >60.0   EGFRNONAA >60.0   , CBC   Recent Labs   Lab 12/14/18  1051  12/14/18  1211   WBC 17.38*  --   --    HGB 14.9  --   --    HCT 44.4   < > 46   *  --   --     < > = values in this interval not displayed.    and INR No results for input(s): INR, PROTIME in the last 48 hours.

## 2018-12-14 NOTE — Clinical Note
25 ml injected throughout the case. 50 mL total wasted during the case. 75 mL total used in the case.

## 2018-12-14 NOTE — ED NOTES
Pt in recliner with sudden onset of decorticate posturing with agonal respirations - patient not responding to deep tactile stimuli - eyes fixed - patient diaphoretic, pale with circumoral cyanosis - oxygen placed via NC at 15 liters until such time as the code cart with ambu bag was made available at 1113    1113 - patient respirations being assisted with ambu bag connected to oxygen source - patient remains with pulse    1114 - patient remains with agonal respirations without response to deep tactile stimulus - respirations being assisted with ambu connected to oxygen source - pulse lost - cardiac monitor CPR pads applied - patient pulled out of recliner and placed on floor for ease of compressions which were immediately started    1116 - stretcher brought over and patient placed onto stretcher with resumption of CPR - patient transported via stretcher to ED bed #1 with Dr. Ellington at side

## 2018-12-14 NOTE — SUBJECTIVE & OBJECTIVE
History reviewed. No pertinent past medical history.    History reviewed. No pertinent surgical history.    Review of patient's allergies indicates:  No Known Allergies    No current facility-administered medications on file prior to encounter.      No current outpatient medications on file prior to encounter.     Family History     None        Tobacco Use    Smoking status: Current Every Day Smoker     Packs/day: 0.50     Types: Cigarettes    Smokeless tobacco: Never Used   Substance and Sexual Activity    Alcohol use: No     Frequency: Never    Drug use: No    Sexual activity: Not on file     Review of Systems   Unable to perform ROS: intubated     Objective:     Vital Signs (Most Recent):  Temp: 100.3 °F (37.9 °C) (12/14/18 1136)  Pulse: 67 (12/14/18 1522)  Resp: (!) 32 (12/14/18 1522)  BP: 131/70 (12/14/18 1522)  SpO2: 100 % (12/14/18 1522) Vital Signs (24h Range):  Temp:  [98.8 °F (37.1 °C)-100.3 °F (37.9 °C)] 100.3 °F (37.9 °C)  Pulse:  [67-97] 67  Resp:  [15-32] 32  SpO2:  [100 %] 100 %  BP: (131-143)/(70-75) 131/70     Weight: 72.6 kg (160 lb)  Body mass index is 22.96 kg/m².    SpO2: 100 %  O2 Device (Oxygen Therapy): ventilator    No intake or output data in the 24 hours ending 12/14/18 1558    Lines/Drains/Airways     Drain                 NG/OG Tube 12/14/18 1149 Edmond sump 18 Fr. Center mouth less than 1 day         Urethral Catheter 12/14/18 1131 Latex less than 1 day          Airway                 Airway - Non-Surgical 12/14/18 1128 Endotracheal Tube less than 1 day          Peripheral Intravenous Line                 Peripheral IV - Single Lumen 12/14/18 1051 Right Antecubital less than 1 day         Peripheral IV - Single Lumen 12/14/18 1130 Anterior;Distal;Left Antecubital less than 1 day         Peripheral IV - Single Lumen 12/14/18 1440 Left Antecubital less than 1 day                Physical Exam   Constitutional: He appears well-developed.   Eyes: Pupils are equal, round, and reactive to  light.   Neck: Neck supple.   Cardiovascular: Normal rate and regular rhythm.   Pulmonary/Chest:   Intubated   Abdominal: Soft. He exhibits no distension.   Musculoskeletal: He exhibits no edema.   Neurological:   Sedated and intubated   Skin: Skin is warm.     Significant Labs:   ABG:   Recent Labs   Lab 12/14/18  1141 12/14/18  1211   PH 7.391 7.223*   PCO2 17.3* 44.5   HCO3 10.5* 18.3*   POCSATURATED 99 100   BE -14 -9   , Blood Culture: No results for input(s): LABBLOO in the last 48 hours., CMP   Recent Labs   Lab 12/14/18  1051      K 3.9      CO2 21*   *   BUN 14   CREATININE 1.2   CALCIUM 11.0*   PROT 8.8*   ALBUMIN 4.6   BILITOT 1.0   ALKPHOS 122   AST 20   ALT 18   ANIONGAP 14   ESTGFRAFRICA >60.0   EGFRNONAA >60.0   , CBC   Recent Labs   Lab 12/14/18  1051  12/14/18  1211   WBC 17.38*  --   --    HGB 14.9  --   --    HCT 44.4   < > 46   *  --   --     < > = values in this interval not displayed.     Significant Imaging:  C (12/14/18):  ·  TDP with cardiac arrest, CPR, ROSC, intubated.  · Normal coronaries  · Abdominal pain and fever, etiology yet determined.  Estimated blood loss: none    X-Ray Abdomen (12/14/18):  - NG tube tip and proximal port are below the GE junction; device could be advanced 10 cm to ensure that the proximal port remains below the GE junction.  - I detect no bowel distension, intramural gas, intra portal gas or free peritoneal gas.  Please see the report of the chest radiograph for further information.  - Overlying the medial aspect of the gastric fundus, projected over the proximal port of the NG tube, is a 5 mm irregularly marginated calcification.  Source is unclear.  This was also present in this location on the chest radiograph performed at the same time.  Clinical significance is unclear.  Location is too cephalad for renal calculus.  Please correlate with examination of the oral cavity to exclude chipped tooth with ingested fragment.    CXR  (12/14/18):  ET tube is in place with its tip above the rodrigo at about the T4 level. NG tube is seen with its tip extending below the level of the diaphragm and not completely included in the field of view but likely in the proximal stomach.  Additional artifacts projected over the thorax.  Heart size and pulmonary vascularity are within normal limits.  Lungs are satisfactorily expanded and appear free of active disease.  No definite pleural fluid or pneumothorax.  Skeletal structures appear intact.

## 2018-12-15 PROBLEM — I50.23 ACUTE ON CHRONIC SYSTOLIC HEART FAILURE: Status: ACTIVE | Noted: 2018-12-15

## 2018-12-15 LAB
ALBUMIN SERPL BCP-MCNC: 3.8 G/DL
ALP SERPL-CCNC: 94 U/L
ALT SERPL W/O P-5'-P-CCNC: 57 U/L
ANION GAP SERPL CALC-SCNC: 10 MMOL/L
ANION GAP SERPL CALC-SCNC: 12 MMOL/L
ANION GAP SERPL CALC-SCNC: 8 MMOL/L
AST SERPL-CCNC: 89 U/L
BASOPHILS # BLD AUTO: 0.05 K/UL
BASOPHILS # BLD AUTO: 0.05 K/UL
BASOPHILS NFR BLD: 0.4 %
BASOPHILS NFR BLD: 0.6 %
BILIRUB SERPL-MCNC: 0.9 MG/DL
BUN SERPL-MCNC: 14 MG/DL
BUN SERPL-MCNC: 16 MG/DL
BUN SERPL-MCNC: 16 MG/DL
CALCIUM SERPL-MCNC: 9.1 MG/DL
CALCIUM SERPL-MCNC: 9.6 MG/DL
CALCIUM SERPL-MCNC: 9.7 MG/DL
CHLORIDE SERPL-SCNC: 108 MMOL/L
CHLORIDE SERPL-SCNC: 110 MMOL/L
CHLORIDE SERPL-SCNC: 111 MMOL/L
CO2 SERPL-SCNC: 21 MMOL/L
CO2 SERPL-SCNC: 23 MMOL/L
CO2 SERPL-SCNC: 23 MMOL/L
CREAT SERPL-MCNC: 1 MG/DL
CREAT SERPL-MCNC: 1.1 MG/DL
CREAT SERPL-MCNC: 1.3 MG/DL
DIFFERENTIAL METHOD: ABNORMAL
DIFFERENTIAL METHOD: ABNORMAL
EOSINOPHIL # BLD AUTO: 0.1 K/UL
EOSINOPHIL # BLD AUTO: 0.1 K/UL
EOSINOPHIL NFR BLD: 0.7 %
EOSINOPHIL NFR BLD: 1.3 %
ERYTHROCYTE [DISTWIDTH] IN BLOOD BY AUTOMATED COUNT: 13.6 %
ERYTHROCYTE [DISTWIDTH] IN BLOOD BY AUTOMATED COUNT: 13.9 %
EST. GFR  (AFRICAN AMERICAN): >60 ML/MIN/1.73 M^2
EST. GFR  (NON AFRICAN AMERICAN): >60 ML/MIN/1.73 M^2
GLUCOSE SERPL-MCNC: 101 MG/DL
GLUCOSE SERPL-MCNC: 76 MG/DL
GLUCOSE SERPL-MCNC: 80 MG/DL
HCT VFR BLD AUTO: 37.5 %
HCT VFR BLD AUTO: 38.4 %
HGB BLD-MCNC: 12.5 G/DL
HGB BLD-MCNC: 12.6 G/DL
IMM GRANULOCYTES # BLD AUTO: 0.02 K/UL
IMM GRANULOCYTES # BLD AUTO: 0.02 K/UL
IMM GRANULOCYTES NFR BLD AUTO: 0.2 %
IMM GRANULOCYTES NFR BLD AUTO: 0.2 %
LYMPHOCYTES # BLD AUTO: 1.8 K/UL
LYMPHOCYTES # BLD AUTO: 1.9 K/UL
LYMPHOCYTES NFR BLD: 15.1 %
LYMPHOCYTES NFR BLD: 21.6 %
MAGNESIUM SERPL-MCNC: 2 MG/DL
MAGNESIUM SERPL-MCNC: 2.3 MG/DL
MAGNESIUM SERPL-MCNC: 2.5 MG/DL
MCH RBC QN AUTO: 29.1 PG
MCH RBC QN AUTO: 29.7 PG
MCHC RBC AUTO-ENTMCNC: 32.8 G/DL
MCHC RBC AUTO-ENTMCNC: 33.3 G/DL
MCV RBC AUTO: 89 FL
MCV RBC AUTO: 89 FL
MONOCYTES # BLD AUTO: 0.8 K/UL
MONOCYTES # BLD AUTO: 0.9 K/UL
MONOCYTES NFR BLD: 10.5 %
MONOCYTES NFR BLD: 7 %
NEUTROPHILS # BLD AUTO: 5.9 K/UL
NEUTROPHILS # BLD AUTO: 8.9 K/UL
NEUTROPHILS NFR BLD: 65.8 %
NEUTROPHILS NFR BLD: 76.6 %
NRBC BLD-RTO: 0 /100 WBC
NRBC BLD-RTO: 0 /100 WBC
PLATELET # BLD AUTO: 303 K/UL
PLATELET # BLD AUTO: 311 K/UL
PMV BLD AUTO: 9.2 FL
PMV BLD AUTO: 9.4 FL
POCT GLUCOSE: 75 MG/DL (ref 70–110)
POCT GLUCOSE: 87 MG/DL (ref 70–110)
POTASSIUM SERPL-SCNC: 3.2 MMOL/L
POTASSIUM SERPL-SCNC: 3.8 MMOL/L
POTASSIUM SERPL-SCNC: 4 MMOL/L
PROT SERPL-MCNC: 7 G/DL
RBC # BLD AUTO: 4.21 M/UL
RBC # BLD AUTO: 4.33 M/UL
SODIUM SERPL-SCNC: 139 MMOL/L
SODIUM SERPL-SCNC: 143 MMOL/L
SODIUM SERPL-SCNC: 144 MMOL/L
TROPONIN I SERPL DL<=0.01 NG/ML-MCNC: 0.26 NG/ML
WBC # BLD AUTO: 11.63 K/UL
WBC # BLD AUTO: 8.99 K/UL

## 2018-12-15 PROCEDURE — 27000221 HC OXYGEN, UP TO 24 HOURS

## 2018-12-15 PROCEDURE — 25000003 PHARM REV CODE 250: Performed by: STUDENT IN AN ORGANIZED HEALTH CARE EDUCATION/TRAINING PROGRAM

## 2018-12-15 PROCEDURE — 83735 ASSAY OF MAGNESIUM: CPT | Mod: 91

## 2018-12-15 PROCEDURE — 94761 N-INVAS EAR/PLS OXIMETRY MLT: CPT

## 2018-12-15 PROCEDURE — 63600175 PHARM REV CODE 636 W HCPCS: Performed by: STUDENT IN AN ORGANIZED HEALTH CARE EDUCATION/TRAINING PROGRAM

## 2018-12-15 PROCEDURE — 83735 ASSAY OF MAGNESIUM: CPT

## 2018-12-15 PROCEDURE — 84484 ASSAY OF TROPONIN QUANT: CPT

## 2018-12-15 PROCEDURE — 80048 BASIC METABOLIC PNL TOTAL CA: CPT

## 2018-12-15 PROCEDURE — 99291 CRITICAL CARE FIRST HOUR: CPT | Mod: ,,, | Performed by: INTERNAL MEDICINE

## 2018-12-15 PROCEDURE — 63600175 PHARM REV CODE 636 W HCPCS

## 2018-12-15 PROCEDURE — 93005 ELECTROCARDIOGRAM TRACING: CPT

## 2018-12-15 PROCEDURE — 80053 COMPREHEN METABOLIC PANEL: CPT

## 2018-12-15 PROCEDURE — 25000003 PHARM REV CODE 250

## 2018-12-15 PROCEDURE — 20000000 HC ICU ROOM

## 2018-12-15 PROCEDURE — 85025 COMPLETE CBC W/AUTO DIFF WBC: CPT

## 2018-12-15 PROCEDURE — 99900035 HC TECH TIME PER 15 MIN (STAT)

## 2018-12-15 PROCEDURE — 99900026 HC AIRWAY MAINTENANCE (STAT)

## 2018-12-15 PROCEDURE — 27100171 HC OXYGEN HIGH FLOW UP TO 24 HOURS

## 2018-12-15 PROCEDURE — 93010 ELECTROCARDIOGRAM REPORT: CPT | Mod: ,,, | Performed by: INTERNAL MEDICINE

## 2018-12-15 PROCEDURE — 80048 BASIC METABOLIC PNL TOTAL CA: CPT | Mod: 91

## 2018-12-15 PROCEDURE — 5A2204Z RESTORATION OF CARDIAC RHYTHM, SINGLE: ICD-10-PCS | Performed by: INTERNAL MEDICINE

## 2018-12-15 PROCEDURE — 99900017 HC EXTUBATION W/PARAMETERS (STAT)

## 2018-12-15 PROCEDURE — 99233 SBSQ HOSP IP/OBS HIGH 50: CPT | Mod: ,,, | Performed by: INTERNAL MEDICINE

## 2018-12-15 PROCEDURE — C1751 CATH, INF, PER/CENT/MIDLINE: HCPCS

## 2018-12-15 PROCEDURE — 36556 INSERT NON-TUNNEL CV CATH: CPT

## 2018-12-15 PROCEDURE — C9113 INJ PANTOPRAZOLE SODIUM, VIA: HCPCS | Performed by: STUDENT IN AN ORGANIZED HEALTH CARE EDUCATION/TRAINING PROGRAM

## 2018-12-15 PROCEDURE — 02HV33Z INSERTION OF INFUSION DEVICE INTO SUPERIOR VENA CAVA, PERCUTANEOUS APPROACH: ICD-10-PCS | Performed by: INTERNAL MEDICINE

## 2018-12-15 RX ORDER — POTASSIUM CHLORIDE 750 MG/1
50 CAPSULE, EXTENDED RELEASE ORAL ONCE
Status: DISCONTINUED | OUTPATIENT
Start: 2018-12-15 | End: 2018-12-15

## 2018-12-15 RX ORDER — LORAZEPAM 2 MG/ML
1 INJECTION INTRAMUSCULAR EVERY 4 HOURS PRN
Status: DISCONTINUED | OUTPATIENT
Start: 2018-12-15 | End: 2018-12-16 | Stop reason: HOSPADM

## 2018-12-15 RX ORDER — METOPROLOL TARTRATE 1 MG/ML
5 INJECTION, SOLUTION INTRAVENOUS EVERY 6 HOURS
Status: DISCONTINUED | OUTPATIENT
Start: 2018-12-15 | End: 2018-12-16 | Stop reason: HOSPADM

## 2018-12-15 RX ORDER — CARVEDILOL 3.12 MG/1
3.12 TABLET ORAL 2 TIMES DAILY
Status: DISCONTINUED | OUTPATIENT
Start: 2018-12-15 | End: 2018-12-15

## 2018-12-15 RX ORDER — MORPHINE SULFATE 2 MG/ML
1 INJECTION, SOLUTION INTRAMUSCULAR; INTRAVENOUS EVERY 4 HOURS PRN
Status: DISCONTINUED | OUTPATIENT
Start: 2018-12-15 | End: 2018-12-16 | Stop reason: HOSPADM

## 2018-12-15 RX ORDER — LISINOPRIL 5 MG/1
5 TABLET ORAL DAILY
Status: DISCONTINUED | OUTPATIENT
Start: 2018-12-15 | End: 2018-12-16 | Stop reason: HOSPADM

## 2018-12-15 RX ORDER — LIDOCAINE HYDROCHLORIDE ANHYDROUS AND DEXTROSE MONOHYDRATE .8; 5 G/100ML; G/100ML
1 INJECTION, SOLUTION INTRAVENOUS CONTINUOUS
Status: DISCONTINUED | OUTPATIENT
Start: 2018-12-15 | End: 2018-12-16 | Stop reason: HOSPADM

## 2018-12-15 RX ORDER — POTASSIUM CHLORIDE 7.45 MG/ML
10 INJECTION INTRAVENOUS
Status: DISCONTINUED | OUTPATIENT
Start: 2018-12-15 | End: 2018-12-15

## 2018-12-15 RX ORDER — POTASSIUM CHLORIDE 20 MEQ/15ML
60 SOLUTION ORAL ONCE
Status: COMPLETED | OUTPATIENT
Start: 2018-12-15 | End: 2018-12-15

## 2018-12-15 RX ORDER — SODIUM,POTASSIUM PHOSPHATES 280-250MG
2 POWDER IN PACKET (EA) ORAL ONCE
Status: DISCONTINUED | OUTPATIENT
Start: 2018-12-15 | End: 2018-12-15

## 2018-12-15 RX ORDER — PANTOPRAZOLE SODIUM 40 MG/10ML
40 INJECTION, POWDER, LYOPHILIZED, FOR SOLUTION INTRAVENOUS DAILY
Status: DISCONTINUED | OUTPATIENT
Start: 2018-12-15 | End: 2018-12-15

## 2018-12-15 RX ORDER — PANTOPRAZOLE SODIUM 40 MG/10ML
40 INJECTION, POWDER, LYOPHILIZED, FOR SOLUTION INTRAVENOUS 2 TIMES DAILY
Status: DISCONTINUED | OUTPATIENT
Start: 2018-12-15 | End: 2018-12-16 | Stop reason: HOSPADM

## 2018-12-15 RX ORDER — LABETALOL HCL 20 MG/4 ML
10 SYRINGE (ML) INTRAVENOUS EVERY 8 HOURS
Status: DISCONTINUED | OUTPATIENT
Start: 2018-12-15 | End: 2018-12-15

## 2018-12-15 RX ORDER — LANOLIN ALCOHOL/MO/W.PET/CERES
400 CREAM (GRAM) TOPICAL 2 TIMES DAILY
Status: DISCONTINUED | OUTPATIENT
Start: 2018-12-15 | End: 2018-12-16

## 2018-12-15 RX ORDER — POTASSIUM CHLORIDE 29.8 MG/ML
40 INJECTION INTRAVENOUS ONCE
Status: COMPLETED | OUTPATIENT
Start: 2018-12-15 | End: 2018-12-15

## 2018-12-15 RX ORDER — ENOXAPARIN SODIUM 100 MG/ML
40 INJECTION SUBCUTANEOUS EVERY 24 HOURS
Status: DISCONTINUED | OUTPATIENT
Start: 2018-12-15 | End: 2018-12-16 | Stop reason: HOSPADM

## 2018-12-15 RX ORDER — DEXMEDETOMIDINE HYDROCHLORIDE 4 UG/ML
0.5 INJECTION, SOLUTION INTRAVENOUS CONTINUOUS
Status: DISCONTINUED | OUTPATIENT
Start: 2018-12-15 | End: 2018-12-16 | Stop reason: HOSPADM

## 2018-12-15 RX ADMIN — LORAZEPAM 1 MG: 2 INJECTION INTRAMUSCULAR; INTRAVENOUS at 11:12

## 2018-12-15 RX ADMIN — PROPOFOL 50 MCG/KG/MIN: 10 INJECTION, EMULSION INTRAVENOUS at 05:12

## 2018-12-15 RX ADMIN — PANTOPRAZOLE SODIUM 40 MG: 40 INJECTION, POWDER, FOR SOLUTION INTRAVENOUS at 10:12

## 2018-12-15 RX ADMIN — METOPROLOL TARTRATE 5 MG: 5 INJECTION, SOLUTION INTRAVENOUS at 06:12

## 2018-12-15 RX ADMIN — LIDOCAINE HYDROCHLORIDE 1 MG/MIN: 8 INJECTION, SOLUTION INTRAVENOUS at 02:12

## 2018-12-15 RX ADMIN — PANTOPRAZOLE SODIUM 40 MG: 40 INJECTION, POWDER, FOR SOLUTION INTRAVENOUS at 09:12

## 2018-12-15 RX ADMIN — DEXMEDETOMIDINE HYDROCHLORIDE 0.5 MCG/KG/HR: 100 INJECTION, SOLUTION, CONCENTRATE INTRAVENOUS at 10:12

## 2018-12-15 RX ADMIN — MAGNESIUM SULFATE HEPTAHYDRATE 3 G: 500 INJECTION, SOLUTION INTRAMUSCULAR; INTRAVENOUS at 04:12

## 2018-12-15 RX ADMIN — Medication 1 MG: at 02:12

## 2018-12-15 RX ADMIN — LORAZEPAM 2 MG: 2 INJECTION INTRAMUSCULAR; INTRAVENOUS at 03:12

## 2018-12-15 RX ADMIN — LORAZEPAM 1 MG: 2 INJECTION INTRAMUSCULAR; INTRAVENOUS at 02:12

## 2018-12-15 RX ADMIN — POTASSIUM CHLORIDE 40 MEQ: 400 INJECTION, SOLUTION INTRAVENOUS at 06:12

## 2018-12-15 RX ADMIN — Medication 1 MG: at 07:12

## 2018-12-15 RX ADMIN — LORAZEPAM 1 MG: 2 INJECTION INTRAMUSCULAR; INTRAVENOUS at 12:12

## 2018-12-15 RX ADMIN — LORAZEPAM 1 MG: 2 INJECTION INTRAMUSCULAR; INTRAVENOUS at 07:12

## 2018-12-15 RX ADMIN — POTASSIUM CHLORIDE 60 MEQ: 20 SOLUTION ORAL at 03:12

## 2018-12-15 RX ADMIN — Medication 200 MCG/HR: at 02:12

## 2018-12-15 RX ADMIN — CHLORHEXIDINE GLUCONATE 15 ML: 1.2 RINSE ORAL at 09:12

## 2018-12-15 RX ADMIN — PROPOFOL 50 MCG/KG/MIN: 10 INJECTION, EMULSION INTRAVENOUS at 02:12

## 2018-12-15 RX ADMIN — METOPROLOL TARTRATE 5 MG: 5 INJECTION, SOLUTION INTRAVENOUS at 11:12

## 2018-12-15 RX ADMIN — Medication 1 MG: at 11:12

## 2018-12-15 RX ADMIN — METOPROLOL TARTRATE 5 MG: 5 INJECTION, SOLUTION INTRAVENOUS at 01:12

## 2018-12-15 RX ADMIN — PIPERACILLIN AND TAZOBACTAM 4.5 G: 4; .5 INJECTION, POWDER, LYOPHILIZED, FOR SOLUTION INTRAVENOUS; PARENTERAL at 05:12

## 2018-12-15 RX ADMIN — VANCOMYCIN HYDROCHLORIDE 1000 MG: 1 INJECTION, POWDER, LYOPHILIZED, FOR SOLUTION INTRAVENOUS at 04:12

## 2018-12-15 RX ADMIN — POTASSIUM CHLORIDE 10 MEQ: 7.46 INJECTION, SOLUTION INTRAVENOUS at 03:12

## 2018-12-15 RX ADMIN — MAGNESIUM OXIDE TAB 400 MG (241.3 MG ELEMENTAL MG) 400 MG: 400 (241.3 MG) TAB at 09:12

## 2018-12-15 NOTE — PLAN OF CARE
Problem: Adult Inpatient Plan of Care  Goal: Plan of Care Review  Assessment and Plan    Nutrition Problem  Inadequate energy intake    Related to (etiology):   Pt was intubated, NPO    Signs and Symptoms (as evidenced by):   Pt NPO, now extubated     Recommendations     Recommendation/Intervention: 1. As feasible, provide regular diet.  Goals: 1. Pt to receive nutrition < 48 hrs.

## 2018-12-15 NOTE — PLAN OF CARE
Problem: Adult Inpatient Plan of Care  Goal: Plan of Care Review  Outcome: Ongoing (interventions implemented as appropriate)  Pt arrived to me at 1350. VSS. Having [id number] sedating pt for CT. Fentanyl and propofol maxed out. Plan for CT tonight. POC discussed with pt and family.

## 2018-12-15 NOTE — PLAN OF CARE
"12/14/2018  9:17 PM    Was paged due to agitation and bloody output from OGT.    On evaluation patient is intubated and sedated, fentanyl drip was started to help calm patient for CT scan.    As per nursing patient still agitated despite maximal dose.     BP (!) 99/55   Pulse 60   Temp 97.9 °F (36.6 °C) (Oral)   Resp 20   Ht 5' 10" (1.778 m)   Wt 72.6 kg (160 lb)   SpO2 100%   BMI 22.96 kg/m²   General: Intubated and sedated, easily arousable and agitated  OG: on high intermittent suction, changed to low      Assessment:  1. Agitation due to intubation, requiring higher doses of sedatives. Will give PRN ativan pushes to help sedate sufficiently for CT  2. UGIB -- mild, tubing with clear output and traces of coffee grounds. Changed suction to low-intermittent, started PPI IV daily.    Faustino Jarrell   "

## 2018-12-15 NOTE — PROGRESS NOTES
Plan of care:    Post extubation with increasing nonsustained runs of PMVT. Initiate with PVC. Longest 5 seconds, all asx, self-terminated.   Trial IV labetalol- unable to give as unavailable (given cocaine positivity on UDS yesterday)  Will trial low dose IV metoprolol, starting with 1 mg, if tolerated well will give 5 total mg, schedule q6.  If continue to occur, will discuss further with EP; consider amiodarone    Guido Gary MD  Cardiology Fellow PGY-5  Pager: [phone]

## 2018-12-15 NOTE — PLAN OF CARE
Problem: Adult Inpatient Plan of Care  Goal: Plan of Care Review  Outcome: Ongoing (interventions implemented as appropriate)  POC reviewed with pt and family at the bedside. Pt remains heavily sedated and maxed on fentanyl and propofol. Still remains restless despite sedation and unable to console when worked up. Ativan given twice overnight. Pt remains on vent settings unchanged, probable to extubate this morning. Pt remains sinus sasha/NSR. Normotensive. Hassan in place draining 25-30 ml/hr. CT of abd and pelvis done overnight. Abx given throughout shift. Pt able to follow minimal commands. See flowsheet for full assessment. VSS. WCTM

## 2018-12-15 NOTE — SUBJECTIVE & OBJECTIVE
Interval History: Overnight, pt had PVC's on telemetry. CT scan of the abdomen done which was unremarkable. UDS positive for BZD, cocaine, opiate and MJ. Pt is sedated/intubated this AM. Family meeting occurred with Dr. Barton this AM.     Review of Systems   Unable to perform ROS: intubated     Objective:     Vital Signs (Most Recent):  Temp: 97.7 °F (36.5 °C) (12/15/18 0701)  Pulse: 60 (12/15/18 0945)  Resp: 14 (12/15/18 0945)  BP: (!) 108/58 (12/15/18 0930)  SpO2: 100 % (12/15/18 0945) Vital Signs (24h Range):  Temp:  [97 °F (36.1 °C)-100.3 °F (37.9 °C)] 97.7 °F (36.5 °C)  Pulse:  [55-97] 60  Resp:  [14-49] 14  SpO2:  [98 %-100 %] 100 %  BP: ()/(47-81) 108/58     Weight: 72.6 kg (160 lb)  Body mass index is 22.96 kg/m².     SpO2: 100 %  O2 Device (Oxygen Therapy): ventilator    Physical Exam   HENT:   ETT and OGT in place    Cardiovascular: Normal rate and regular rhythm.   Pulmonary/Chest: Effort normal.   Abdominal: Soft.   Skin: Skin is warm.       Significant Labs: All pertinent lab results from the last 24 hours have been reviewed.    Significant Imaging: Echocardiogram:   Transthoracic echo (TTE) complete (Cupid Only):   Results for orders placed or performed during the hospital encounter of 12/14/18   Transthoracic echo (TTE) complete (Cupid Only)   Result Value Ref Range    Ascending aorta 2.28 cm    STJ 2.57 cm    AV mean gradient 2.18 mmHg    Ao peak jhony 0.99 m/s    Ao VTI 18.82 cm    IVS 0.92 0.6 - 1.1 cm    LA size 2.45 cm    Left Atrium Major Axis 5.07 cm    Left Atrium Minor Axis 4.84 cm    LVIDD 5.56 3.5 - 6.0 cm    LVIDS 4.86 (A) 2.1 - 4.0 cm    LVOT diameter 2.30 cm    LVOT peak VTI 11.08 cm    PW 0.95 0.6 - 1.1 cm    MV Peak A Jhony 0.37 m/s    E wave decelartion time 192.28 msec    MV Peak E Jhony 0.59 m/s    PV Peak D Jhony 0.42 m/s    PV Peak S Jhony 0.44 m/s    RA Major Axis 4.38 cm    RA Width 3.62 cm    RVDD 3.12 cm    Sinus 3.01 cm    TAPSE 1.37 cm    TR Max Jhony 1.58 m/s    TDI LATERAL  0.11     TDI SEPTAL 0.07     LA WIDTH 3.56 cm    LV Diastolic Volume 151.14 mL    LV Systolic Volume 110.52 mL    RV S' 7.87 m/s    LV LATERAL E/E' RATIO 5.36     LV SEPTAL E/E' RATIO 8.43     FS 13 %    LA volume 36.72 cm3    LV mass 198.86 g    Left Ventricle Relative Wall Thickness 0.34 cm    AV valve area 2.44 cm2    AV index (prosthetic) 0.59     E/A ratio 1.59     Mean e' 0.09     Pulm vein S/D ratio 1.05     LVOT area 4.15 cm2    LVOT stroke volume 46.01 cm3    AV peak gradient 3.92 mmHg    E/E' ratio 6.56     LV Systolic Volume Index 58.2 mL/m2    LV Diastolic Volume Index 79.61 mL/m2    LA Volume Index 19.3 mL/m2    LV Mass Index 104.7 g/m2    Triscuspid Valve Regurgitation Peak Gradient 9.99 mmHg    BSA 1.89 m2

## 2018-12-15 NOTE — SUBJECTIVE & OBJECTIVE
History reviewed. No pertinent past medical history.    History reviewed. No pertinent surgical history.    Review of patient's allergies indicates:  No Known Allergies    No current facility-administered medications on file prior to encounter.      No current outpatient medications on file prior to encounter.     Family History     None        Tobacco Use    Smoking status: Current Every Day Smoker     Packs/day: 0.50     Types: Cigarettes    Smokeless tobacco: Never Used   Substance and Sexual Activity    Alcohol use: No     Frequency: Never    Drug use: No    Sexual activity: Not on file     Review of Systems   Unable to perform ROS: intubated     Objective:     Vital Signs (Most Recent):  Temp: 97.9 °F (36.6 °C) (12/14/18 1500)  Pulse: 62 (12/14/18 1711)  Resp: (!) 39 (12/14/18 1711)  BP: (!) 140/81 (12/14/18 1530)  SpO2: 100 % (12/14/18 1711) Vital Signs (24h Range):  Temp:  [97.7 °F (36.5 °C)-100.3 °F (37.9 °C)] 97.9 °F (36.6 °C)  Pulse:  [62-97] 62  Resp:  [15-39] 39  SpO2:  [98 %-100 %] 100 %  BP: (119-143)/(70-81) 140/81       Weight: 72.6 kg (160 lb)  Body mass index is 22.96 kg/m².    SpO2: 100 %  O2 Device (Oxygen Therapy): ventilator    Physical Exam  General: Intubated and sedated  Eyes:PERRL.   Neck:no JVD   Lungs:  clear to auscultation bilaterally   Cardiovascular: Heart: regular rate and rhythm, S1, S2 normal, no murmur, click, rub or gallop.   Pulses-2+ radial, femoral, DP, PT b/l  Extremities: no cyanosis or edema.   Abdomen/Rectal: Abdomen: soft, non-distented  Neurologic: Unable to assess  Significant Labs:   BMP:   Recent Labs   Lab 12/14/18  1051 12/14/18  1544   * 121*    141   K 3.9 4.4    108   CO2 21* 23   BUN 14 15   CREATININE 1.2 1.2   CALCIUM 11.0* 10.3   MG 1.7  --    , CMP:   Recent Labs   Lab 12/14/18  1051 12/14/18  1544    141   K 3.9 4.4    108   CO2 21* 23   * 121*   BUN 14 15   CREATININE 1.2 1.2   CALCIUM 11.0* 10.3   PROT 8.8* 8.4    ALBUMIN 4.6 4.4   BILITOT 1.0 1.2*   ALKPHOS 122 120   AST 20 97*   ALT 18 78*   ANIONGAP 14 10   ESTGFRAFRICA >60.0 >60.0   EGFRNONAA >60.0 >60.0   , CBC:   Recent Labs   Lab 12/14/18  1051  12/14/18  1211   WBC 17.38*  --   --    HGB 14.9  --   --    HCT 44.4   < > 46   *  --   --     < > = values in this interval not displayed.    and INR: No results for input(s): INR, PROTIME in the last 48 hours.    Significant Imaging: Echocardiogram: 2D echo with color flow doppler: No results found for this or any previous visit.

## 2018-12-15 NOTE — HOSPITAL COURSE
"The next day, patient was successful extubated to room air. Urine drug screen was presumptive positive for opioids, BZDs, THC, and cocaine. He did code in the later morning (see significant event note) where he became unresponsive and became hemodynamically unstable. He underwent less than 1 round of compressions and was successfully cardioverted. He had persistent PVCs with 3-4 seconds of VTach on telemetry afterwards. Event was discussed with Dr. Barton, and it was agreed to start lidocaine drip as amiodarone prolongs the QTc (previous EKGs showed QTc 600-700). Metoprolol 5mg IV Q6H was also begun. Central line was place for medication administration.     Psychiatry was consulted as patient endorsed suicidal ideation, saying he wanted to leave AMA to "finish the job" (of killing himself). Patient was PECed, and bedside sitter and suicidal precautions were put in place.     Today, patient is having ectopy this morning. Lidocaine drip and metoprolol 5mg IV were continued as it helped to decrease the frequency of PVCs and runs of Vtach. Psychiatry evaluated patient this morning and recommended to discontinue PEC as patient is not a threat to himself or others. They have suggested outpatient psychotherapy as patient is dealing with a lot of stressors at home. EP recommended Lifevest upon discharge and TTE in 6 weeks in helping to determine whether ICD placement will be necessary.    As patient's urine drug screen was positive, and it is not known exactly what the patient ingested, it is thought that patient's arrhythmias may be drug-induced. A congenital etiology such as a structural or functional abnormality is also a possibility given that patient has had a prior history of PVCs and an unclear cardiac medical history. Acute myocarditis and electrolyte disturbances were also considered in the differential but given patient's electrolytes were relatively normal on admission and positive drug use, it is most likely " the cause of his cardiac arrhthymias.     On hospital day 3 (day of AMA discharge), patient expressed his wishes to leave the hospital. Despite the team discussing the risks, which included further arrhthymias, cardiac arrest and death, of leaving the hospital at this time, patient adamantly re-instated that he wanted to home. He understood the risks of leaving AMA and filled out the AMA paperwork. He was discharged AMA.

## 2018-12-15 NOTE — HPI
"23 year old male with no known past medical history who presented with abdominal pain and nausea, vomiting to ED today.During initial assessment patient became agonal and lost pulse and CPR performed for 15 mins. Rhythm strip revealed Torsaides de pointes and patient was defibrillated with return of ROSC. He was intubated during the code. After ROSC was obtained patient was taken to cath lab and coronary angiogram performed that revealed normal coronaries. Patient having frequent PVC on monitor. EP consulted for management of Arrhythmia.Patient currently intubated and sedated. History from family at bedside. Patient has been having viral and flu like illness for last 2 weeks. Also patient was told that he has an "old man's heart" in [place] about 1-2 years ago and has frequent PVCs.He was told to get stress test but never got it done. No previous history of syncope. No h/o sudden cardiac death in family.smokes marijuana occasionally.  "

## 2018-12-15 NOTE — PROGRESS NOTES
[facility]  Cardiac Electrophysiology  Progress Note    Admission Date: 12/14/2018  Code Status: Full Code   Attending Physician: Polo Patel MD   Expected Discharge Date:   Principal Problem:Torsades de pointes    Subjective:     Interval History: Overnight, pt had PVC's on telemetry. CT scan of the abdomen done which was unremarkable. UDS positive for BZD, cocaine, opiate and MJ. Pt is sedated/intubated this AM. Family meeting occurred with Dr. Barton this AM.     Review of Systems   Unable to perform ROS: intubated     Objective:     Vital Signs (Most Recent):  Temp: 97.7 °F (36.5 °C) (12/15/18 0701)  Pulse: 60 (12/15/18 0945)  Resp: 14 (12/15/18 0945)  BP: (!) 108/58 (12/15/18 0930)  SpO2: 100 % (12/15/18 0945) Vital Signs (24h Range):  Temp:  [97 °F (36.1 °C)-100.3 °F (37.9 °C)] 97.7 °F (36.5 °C)  Pulse:  [55-97] 60  Resp:  [14-49] 14  SpO2:  [98 %-100 %] 100 %  BP: ()/(47-81) 108/58     Weight: 72.6 kg (160 lb)  Body mass index is 22.96 kg/m².     SpO2: 100 %  O2 Device (Oxygen Therapy): ventilator    Physical Exam   Neuro: sedated  HENT: ETT and OGT in place    Cardiovascular: Normal rate and regular rhythm.   Pulmonary/Chest: Effort normal.   Abdominal: Soft.   Skin: Skin is warm.     Significant Labs: All pertinent lab results from the last 24 hours have been reviewed.    Significant Imaging: Echocardiogram:   Transthoracic echo (TTE) complete (Cupid Only):   Results for orders placed or performed during the hospital encounter of 12/14/18   Transthoracic echo (TTE) complete (Cupid Only)   Result Value Ref Range    Ascending aorta 2.28 cm    STJ 2.57 cm    AV mean gradient 2.18 mmHg    Ao peak maria e 0.99 m/s    Ao VTI 18.82 cm    IVS 0.92 0.6 - 1.1 cm    LA size 2.45 cm    Left Atrium Major Axis 5.07 cm    Left Atrium Minor Axis 4.84 cm    LVIDD 5.56 3.5 - 6.0 cm    LVIDS 4.86 (A) 2.1 - 4.0 cm    LVOT diameter 2.30 cm    LVOT peak VTI 11.08 cm    PW 0.95 0.6 - 1.1 cm    MV  Peak A Jhony 0.37 m/s    E wave decelartion time 192.28 msec    MV Peak E Jhony 0.59 m/s    PV Peak D Jhony 0.42 m/s    PV Peak S Jhony 0.44 m/s    RA Major Axis 4.38 cm    RA Width 3.62 cm    RVDD 3.12 cm    Sinus 3.01 cm    TAPSE 1.37 cm    TR Max Jhony 1.58 m/s    TDI LATERAL 0.11     TDI SEPTAL 0.07     LA WIDTH 3.56 cm    LV Diastolic Volume 151.14 mL    LV Systolic Volume 110.52 mL    RV S' 7.87 m/s    LV LATERAL E/E' RATIO 5.36     LV SEPTAL E/E' RATIO 8.43     FS 13 %    LA volume 36.72 cm3    LV mass 198.86 g    Left Ventricle Relative Wall Thickness 0.34 cm    AV valve area 2.44 cm2    AV index (prosthetic) 0.59     E/A ratio 1.59     Mean e' 0.09     Pulm vein S/D ratio 1.05     LVOT area 4.15 cm2    LVOT stroke volume 46.01 cm3    AV peak gradient 3.92 mmHg    E/E' ratio 6.56     LV Systolic Volume Index 58.2 mL/m2    LV Diastolic Volume Index 79.61 mL/m2    LA Volume Index 19.3 mL/m2    LV Mass Index 104.7 g/m2    Triscuspid Valve Regurgitation Peak Gradient 9.99 mmHg    BSA 1.89 m2     Assessment and Plan:     Sudden cardiac arrest    -S/p successful resuscitation-currently intubated/sedated but moving all 4 extremities -no hypothermia protocol initiated  -TdP with normal electrolytes, slightly prolonged QTc which could have been post arrest  -Patient's recent viral illness could suggest acute myocarditis - however not significant troponin elevation   -Echo with EF 20 %  -UDS positive for BZD, opiate, MJ and cocaine  -will cancel cardiac MRI, no need for further evaluation, given +UDS  -discussed with pt and family, recommend Life Vest on hospital discharge and repeat TTE O/P in 3 months, at the time pt will follow up with Cardiology clinic to determine if EF improved/also UDS will need to be repeated, and discussion if ICD implantation is needed  -keep Magnesium >2 , potassium >4  -EP team will continue to follow      Marlene Al MD  Cardiac Electrophysiology  [facility]

## 2018-12-15 NOTE — CONSULTS
"  [facility]  Adult Nutrition  Consult Note    SUMMARY     Recommendations    Recommendation/Intervention: 1. As feasible, provide regular diet.  Goals: 1. Pt to receive nutrition < 48 hrs.  Nutrition Goal Status: new  Communication of RD Recs: (POC)    Reason for Assessment    Reason For Assessment: consult  Diagnosis: (Torsades de Pointes tachycardia)  Relevant Medical History: past ETOH use, bleeding ulcers  General Information Comments:  Pt with acute abdominal pain and nausea (10-15 episodes diarrhea with dark red stool and 20 episodes vomiting) lost pulse, is s/p CPR and intubation. Pt seen at bedside was extubated, able to converse, drinking clear liquids with no difficulty. Per pt, he lost a few pounds (2-3) recently because he found it was better to eat smaller meals to avoid stomach upset. Pt appears nourished, no wasting noted.  Nutrition Discharge Planning: pt with adequate po intake to meet nutrition needs    Nutrition/Diet History    Patient Reported Diet/Restrictions/Preferences: general    Anthropometrics    Temp: 98.6 °F (37 °C)  Height Method: Stated  Height: 5' 10" (177.8 cm)  Height (inches): 70 in  Weight Method: Stated  Weight: 72.6 kg (160 lb)  Weight (lb): 160 lb  Ideal Body Weight (IBW), Male: 166 lb  % Ideal Body Weight, Male (lb): 96.39 lb  BMI (Calculated): 23       Lab/Procedures/Meds    Pertinent Labs Reviewed: reviewed  Pertinent Labs Comments: AST 89, ALT 57  Pertinent Medications Reviewed: reviewed  Pertinent Medications Comments: pantoprazole    Estimated/Assessed Needs    Weight Used For Calorie Calculations: 72.6 kg (160 lb 0.9 oz)  Energy Calorie Requirements (kcal): 2159 kcal  Energy Need Method: [place]-St Jeor(PAL 1.25)  Protein Requirements: 58-73g  Weight Used For Protein Calculations: 72.6 kg (160 lb 0.9 oz)  RDA Method (mL): 2159      Nutrition Prescription Ordered    Current Diet Order: NPO    Evaluation of Received Nutrient/Fluid Intake    Comments: " no LBM noted  % Intake of Estimated Energy Needs: 0 - 25 %  % Meal Intake: NPO    Nutrition Risk    Level of Risk/Frequency of Follow-up: low     Assessment and Plan    Nutrition Problem  Inadequate energy intake    Related to (etiology):   Pt was intubated, NPO    Signs and Symptoms (as evidenced by):   Pt NPO, now extubated       Nutrition Diagnosis Status:   Resolved         Monitor and Evaluation    Food and Nutrient Intake: energy intake, food and beverage intake  Food and Nutrient Adminstration: diet order  Anthropometric Measurements: weight, weight change, body mass index  Biochemical Data, Medical Tests and Procedures: electrolyte and renal panel, gastrointestinal profile, glucose/endocrine profile, inflammatory profile, lipid profile  Nutrition-Focused Physical Findings: overall appearance, extremities, muscles and bones, head and eyes, skin     Malnutrition Assessment      Pt does not meet criteria for moderate malnutrition at this time.    Nutrition Follow-Up    RD Follow-up?: Yes

## 2018-12-15 NOTE — ASSESSMENT & PLAN NOTE
-S/p successful resuscitation-currently intubated/sedated but moving all 4 extremities -no hypothermia protocol initiated  -TdP with normal electrolytes, slightly prolonged QTc which could have been post arrest  -Patient's recent viral illness could suggest acute myocarditis - however not significant troponin elevation   -Echo with EF 20 %  -UDS positive for BZD, opiate, MJ and cocaine  -will cancel cardiac MRI, no need for further evaluation, given +UDS  -discussed with pt and family, recommend Life Vest on hospital discharge and repeat TTE O/P in 3 months, at the time pt will follow up with Cardiology clinic to determine if EF improved/also UDS will need to be repeated, and discussion if ICD implantation is needed  -keep Magnesium >2 , potassium >4  -EP team will continue to follow

## 2018-12-15 NOTE — SIGNIFICANT EVENT
CCU team was called after patient went into torsades. Patient become hemodynamically unstable and became unconscious. He underwent one round of CPR and was successfully cardioverted. He became awake and conscious but continued to have PVCs. Event was discussed with Dr. Barton, and it was agreed to start lidocaine drip as amiodarone would further prolong the QTc (Prevous EKGs have shown QTc in 600-700s). Metoprolol 5mg IV Q6H was begun as well.    Please contact [facility] for any questions.     Venus Christianson MD1  [facility]

## 2018-12-15 NOTE — CONSULTS
"[facility]  Cardiac Electrophysiology  Consult Note    Admission Date: 12/14/2018  Code Status: Full Code   Attending Provider: Polo Patel MD  Consulting Provider: Rito Skinner MD  Principal Problem:Torsades de pointes    Consults  Subjective:     Chief Complaint:  Sudden cardiac arrest  HPI:   23 year old male with no known past medical history who presented with abdominal pain and nausea, vomiting to ED today.During initial assessment patient became agonal and lost pulse and CPR performed for 15 mins. Rhythm strip revealed Torsaides de pointes and patient was defibrillated with return of ROSC. He was intubated during the code. After ROSC was obtained patient was taken to cath lab and coronary angiogram performed that revealed normal coronaries. Patient having frequent PVC on monitor. EP consulted for management of Arrhythmia.Patient currently intubated and sedated. History from family at bedside. Patient has been having viral and flu like illness for last 2 weeks. Also patient was told that he has an "old man's heart" in [place] about 1-2 years ago and has frequent PVCs.He was told to get stress test but never got it done. No previous history of syncope. No h/o sudden cardiac death in family.smokes marijuana occasionally.    History reviewed. No pertinent past medical history.    History reviewed. No pertinent surgical history.    Review of patient's allergies indicates:  No Known Allergies    No current facility-administered medications on file prior to encounter.      No current outpatient medications on file prior to encounter.     Family History     None        Tobacco Use    Smoking status: Current Every Day Smoker     Packs/day: 0.50     Types: Cigarettes    Smokeless tobacco: Never Used   Substance and Sexual Activity    Alcohol use: No     Frequency: Never    Drug use: No    Sexual activity: Not on file     Review of Systems   Unable to perform ROS: intubated "     Objective:     Vital Signs (Most Recent):  Temp: 97.9 °F (36.6 °C) (12/14/18 1500)  Pulse: 62 (12/14/18 1711)  Resp: (!) 39 (12/14/18 1711)  BP: (!) 140/81 (12/14/18 1530)  SpO2: 100 % (12/14/18 1711) Vital Signs (24h Range):  Temp:  [97.7 °F (36.5 °C)-100.3 °F (37.9 °C)] 97.9 °F (36.6 °C)  Pulse:  [62-97] 62  Resp:  [15-39] 39  SpO2:  [98 %-100 %] 100 %  BP: (119-143)/(70-81) 140/81       Weight: 72.6 kg (160 lb)  Body mass index is 22.96 kg/m².    SpO2: 100 %  O2 Device (Oxygen Therapy): ventilator    Physical Exam  General: Intubated and sedated  Eyes:PERRL.   Neck:no JVD   Lungs:  clear to auscultation bilaterally   Cardiovascular: Heart: regular rate and rhythm, S1, S2 normal, no murmur, click, rub or gallop.   Pulses-2+ radial, femoral, DP, PT b/l  Extremities: no cyanosis or edema.   Abdomen/Rectal: Abdomen: soft, non-distented  Neurologic: Unable to assess  Significant Labs:   BMP:   Recent Labs   Lab 12/14/18  1051 12/14/18  1544   * 121*    141   K 3.9 4.4    108   CO2 21* 23   BUN 14 15   CREATININE 1.2 1.2   CALCIUM 11.0* 10.3   MG 1.7  --    , CMP:   Recent Labs   Lab 12/14/18  1051 12/14/18  1544    141   K 3.9 4.4    108   CO2 21* 23   * 121*   BUN 14 15   CREATININE 1.2 1.2   CALCIUM 11.0* 10.3   PROT 8.8* 8.4   ALBUMIN 4.6 4.4   BILITOT 1.0 1.2*   ALKPHOS 122 120   AST 20 97*   ALT 18 78*   ANIONGAP 14 10   ESTGFRAFRICA >60.0 >60.0   EGFRNONAA >60.0 >60.0   , CBC:   Recent Labs   Lab 12/14/18  1051  12/14/18  1211   WBC 17.38*  --   --    HGB 14.9  --   --    HCT 44.4   < > 46   *  --   --     < > = values in this interval not displayed.    and INR: No results for input(s): INR, PROTIME in the last 48 hours.    Significant Imaging: Echocardiogram: 2D echo with color flow doppler: No results found for this or any previous visit.              Assessment and Plan:     Sudden cardiac arrest    S/p successful resuscitation-currently intubated and sedated  "but moving all 4 extremities -no hypothermia protocol initiated  TdP with normal electrolytes, slightly prolonged QTc which could have been post arrest  Patient's recent viral illness could suggest acute myocarditis -recommend checking troponin  Echo with EF 20 %-will need Cardiac MR to evaluate for infiltrative cardiomyopathy with patient's history of "old man's heart" and frequent PVCs.  Keep Magnesium >2 , potassium >4  Will need ICD prior to discharge unless a reversible cause is found  Extubation per primary team           Thank you for your consult.Ep will follow the patient.  Rito Skinner MD  Cardiac Electrophysiology  [facility]    "

## 2018-12-15 NOTE — PROCEDURES
"Reggie To is a 23 y.o. male patient.    Temp: (P) 98.6 °F (37 °C) (12/15/18 1500)  Pulse: 98 (12/15/18 1600)  Resp: (!) 32 (12/15/18 1600)  BP: (!) 118/56 (12/15/18 1600)  SpO2: 97 % (12/15/18 1600)  Weight: 72.6 kg (160 lb) (12/14/18 1530)  Height: 5' 10" (177.8 cm) (12/14/18 1530)       Central Line  Date/Time: 12/15/2018 5:12 PM  Performed by: Oskar Ryder MD  Assisting provider: Venus Christianson MD  Pre-operative Diagnosis: Torsades de pointes  Post-operative diagnosis: Torsades de pointes  Consent Done: Yes  Time out: Immediately prior to procedure a "time out" was called to verify the correct patient, procedure, equipment, support staff and site/side marked as required.  Indications: med administration  Preparation: skin prepped with ChloraPrep  Location details: right internal jugular  Catheter type: trialysis  Catheter size: 12 Fr  Ultrasound guidance: yes  Number of attempts: 1  Estimated blood loss (mL): 10  Specimens: No  Post-procedure: line sutured,  chlorhexidine patch,  sterile dressing applied and blood return through all ports  Complications: No        "

## 2018-12-15 NOTE — EICU
eICU called into room by bedside team. Per bedside team report, pt in Torsades. Pt defibrillated x1 into a sinus rhythm with frequent PVC's.

## 2018-12-15 NOTE — ASSESSMENT & PLAN NOTE
"S/p successful resuscitation-currently intubated and sedated but moving all 4 extremities -no hypothermia protocol initiated  TdP with normal electrolytes, slightly prolonged QTc which could have been post arrest  Patient's recent viral illness could suggest acute myocarditis -recommend checking troponin  Echo with EF 20 %-will need Cardiac MR to evaluate for infiltrative cardiomyopathy with patient's history of "old man's heart" and frequent PVCs.  Keep Magnesium >2 , potassium >4  Will need ICD prior to discharge unless a reversible cause is found  Extubation per primary team    "

## 2018-12-16 VITALS
OXYGEN SATURATION: 100 % | TEMPERATURE: 99 F | SYSTOLIC BLOOD PRESSURE: 151 MMHG | RESPIRATION RATE: 29 BRPM | HEART RATE: 82 BPM | DIASTOLIC BLOOD PRESSURE: 88 MMHG | WEIGHT: 160 LBS | HEIGHT: 70 IN | BODY MASS INDEX: 22.9 KG/M2

## 2018-12-16 PROBLEM — J96.02 ACUTE RESPIRATORY FAILURE WITH HYPERCAPNIA: Status: RESOLVED | Noted: 2018-12-14 | Resolved: 2018-12-16

## 2018-12-16 PROBLEM — E87.20 LACTIC ACIDOSIS: Status: RESOLVED | Noted: 2018-12-14 | Resolved: 2018-12-16

## 2018-12-16 PROBLEM — R73.9 HYPERGLYCEMIA: Status: RESOLVED | Noted: 2018-12-14 | Resolved: 2018-12-16

## 2018-12-16 PROBLEM — F43.20 ADJUSTMENT DISORDER, UNSPECIFIED: Status: ACTIVE | Noted: 2018-12-16

## 2018-12-16 PROBLEM — R45.851 SUICIDAL THOUGHTS: Status: ACTIVE | Noted: 2018-12-16

## 2018-12-16 PROBLEM — F12.10 CANNABIS ABUSE: Chronic | Status: ACTIVE | Noted: 2018-12-16

## 2018-12-16 LAB
ALBUMIN SERPL BCP-MCNC: 3.7 G/DL
ALP SERPL-CCNC: 95 U/L
ALT SERPL W/O P-5'-P-CCNC: 79 U/L
ANION GAP SERPL CALC-SCNC: 8 MMOL/L
AST SERPL-CCNC: 150 U/L
BASOPHILS # BLD AUTO: 0.03 K/UL
BASOPHILS NFR BLD: 0.3 %
BILIRUB SERPL-MCNC: 1 MG/DL
BUN SERPL-MCNC: 13 MG/DL
CALCIUM SERPL-MCNC: 9.3 MG/DL
CHLORIDE SERPL-SCNC: 106 MMOL/L
CO2 SERPL-SCNC: 24 MMOL/L
CREAT SERPL-MCNC: 0.9 MG/DL
DIFFERENTIAL METHOD: ABNORMAL
EOSINOPHIL # BLD AUTO: 0.2 K/UL
EOSINOPHIL NFR BLD: 1.7 %
ERYTHROCYTE [DISTWIDTH] IN BLOOD BY AUTOMATED COUNT: 13.3 %
EST. GFR  (AFRICAN AMERICAN): >60 ML/MIN/1.73 M^2
EST. GFR  (NON AFRICAN AMERICAN): >60 ML/MIN/1.73 M^2
GLUCOSE SERPL-MCNC: 82 MG/DL
HCT VFR BLD AUTO: 36.7 %
HGB BLD-MCNC: 12.1 G/DL
IMM GRANULOCYTES # BLD AUTO: 0.02 K/UL
IMM GRANULOCYTES NFR BLD AUTO: 0.2 %
LYMPHOCYTES # BLD AUTO: 1.4 K/UL
LYMPHOCYTES NFR BLD: 14.9 %
MAGNESIUM SERPL-MCNC: 1.9 MG/DL
MCH RBC QN AUTO: 28.7 PG
MCHC RBC AUTO-ENTMCNC: 33 G/DL
MCV RBC AUTO: 87 FL
MONOCYTES # BLD AUTO: 0.8 K/UL
MONOCYTES NFR BLD: 9 %
NEUTROPHILS # BLD AUTO: 6.7 K/UL
NEUTROPHILS NFR BLD: 73.9 %
NRBC BLD-RTO: 0 /100 WBC
PLATELET # BLD AUTO: 309 K/UL
PMV BLD AUTO: 9.5 FL
POTASSIUM SERPL-SCNC: 3.9 MMOL/L
PROT SERPL-MCNC: 7 G/DL
RBC # BLD AUTO: 4.22 M/UL
SODIUM SERPL-SCNC: 138 MMOL/L
WBC # BLD AUTO: 9.08 K/UL

## 2018-12-16 PROCEDURE — C9113 INJ PANTOPRAZOLE SODIUM, VIA: HCPCS | Performed by: STUDENT IN AN ORGANIZED HEALTH CARE EDUCATION/TRAINING PROGRAM

## 2018-12-16 PROCEDURE — 80053 COMPREHEN METABOLIC PANEL: CPT

## 2018-12-16 PROCEDURE — 36556 INSERT NON-TUNNEL CV CATH: CPT

## 2018-12-16 PROCEDURE — 99239 HOSP IP/OBS DSCHRG MGMT >30: CPT | Mod: ,,, | Performed by: INTERNAL MEDICINE

## 2018-12-16 PROCEDURE — 51798 US URINE CAPACITY MEASURE: CPT

## 2018-12-16 PROCEDURE — 85025 COMPLETE CBC W/AUTO DIFF WBC: CPT

## 2018-12-16 PROCEDURE — 99223 1ST HOSP IP/OBS HIGH 75: CPT | Mod: ,,, | Performed by: PSYCHIATRY & NEUROLOGY

## 2018-12-16 PROCEDURE — 25000003 PHARM REV CODE 250: Performed by: STUDENT IN AN ORGANIZED HEALTH CARE EDUCATION/TRAINING PROGRAM

## 2018-12-16 PROCEDURE — 63600175 PHARM REV CODE 636 W HCPCS: Performed by: STUDENT IN AN ORGANIZED HEALTH CARE EDUCATION/TRAINING PROGRAM

## 2018-12-16 PROCEDURE — C1751 CATH, INF, PER/CENT/MIDLINE: HCPCS

## 2018-12-16 PROCEDURE — 93308 TTE F-UP OR LMTD: CPT

## 2018-12-16 PROCEDURE — 94761 N-INVAS EAR/PLS OXIMETRY MLT: CPT

## 2018-12-16 PROCEDURE — 83735 ASSAY OF MAGNESIUM: CPT

## 2018-12-16 RX ORDER — MAGNESIUM SULFATE HEPTAHYDRATE 40 MG/ML
2 INJECTION, SOLUTION INTRAVENOUS ONCE
Status: COMPLETED | OUTPATIENT
Start: 2018-12-16 | End: 2018-12-16

## 2018-12-16 RX ORDER — SODIUM CHLORIDE 9 MG/ML
INJECTION, SOLUTION INTRAVENOUS CONTINUOUS
Status: ACTIVE | OUTPATIENT
Start: 2018-12-16 | End: 2018-12-16

## 2018-12-16 RX ORDER — POTASSIUM CHLORIDE 29.8 MG/ML
40 INJECTION INTRAVENOUS ONCE
Status: COMPLETED | OUTPATIENT
Start: 2018-12-16 | End: 2018-12-16

## 2018-12-16 RX ADMIN — POTASSIUM CHLORIDE 40 MEQ: 400 INJECTION, SOLUTION INTRAVENOUS at 08:12

## 2018-12-16 RX ADMIN — METOPROLOL TARTRATE 5 MG: 5 INJECTION, SOLUTION INTRAVENOUS at 12:12

## 2018-12-16 RX ADMIN — Medication 1 MG: at 01:12

## 2018-12-16 RX ADMIN — PANTOPRAZOLE SODIUM 40 MG: 40 INJECTION, POWDER, FOR SOLUTION INTRAVENOUS at 08:12

## 2018-12-16 RX ADMIN — METOPROLOL TARTRATE 5 MG: 5 INJECTION, SOLUTION INTRAVENOUS at 06:12

## 2018-12-16 RX ADMIN — MAGNESIUM SULFATE IN WATER 2 G: 40 INJECTION, SOLUTION INTRAVENOUS at 08:12

## 2018-12-16 RX ADMIN — SODIUM CHLORIDE: 0.9 INJECTION, SOLUTION INTRAVENOUS at 05:12

## 2018-12-16 RX ADMIN — Medication 1 MG: at 08:12

## 2018-12-16 RX ADMIN — LORAZEPAM 1 MG: 2 INJECTION INTRAMUSCULAR; INTRAVENOUS at 04:12

## 2018-12-16 NOTE — HPI
"Reggie To  : 1995  MRN: [id number]    Consultation-Liaison Psychiatry Consult Note    Chief Complaint / Reason for Consult:     suicidal ideation     Subjective:     History of Present Illness:   Reggie To is a 23 y.o. male with no past psychiatric  History  who presented to [facility] due to SI.    Per primary team  HPI:  Mr. To is a 23 year old male who initially presented to the ED with complaint of acute onset abdominal pain. Patient woke up at 6:30 AM today with severe abdominal pain and nausea. Per ED note, he had 20 episodes of vomiting and 10-15 episodes of diarrhea with dark red stool. At that time, patient reported 10/10 intensity pain described as generalized abdominal cramping. While patient was in triage, patient lost his conscious and he was pulseless. CPR was started, and he had ROSC after 15 minutes. He was intubated and sedated.     Patient was accompanied by his wife and family who provided the history. Per wife, patient's most recent meal was yesterday evening consisting of pizza. Last night around 11:30, he complained of his hands and legs feeling "numb". This morning, he vomited and had a fever of 104.1. He reported "don't feel too good" and asked his wife to call an ambulance. He has a history of PVCs since 2016, but his mother has reported that he was "born with it". He had been on medications for this, but this was stopped about 7-8 months ago. He also has a history of "bleeding ulcers" since 2015 due to EtOH use. This apparently happens "every 1-2 years". He is on pills for this. Wife states that patient has had ED visits in the past for similar symptoms and improvement with Zofran and GI cocktail. He was last hospitalized for this about 2 years ago. Wife also said that he is non-compliant with medications and does not like taking medications that are prescribed. Per aunt, he has been having "flu-like illness" for past few weeks. He smokes half a pack a of cigarettes per day for the " "past 4 years. He has not had recent EtOH use. He uses marijuana, but it is unknown how much and how often. Patient and family are originally from [place], and patient primarily receives his care there. Per family, patient is in [place] visiting family.       Psychiatric Evaluation  Upon entering room patient is laying in bed comfortable and is agreeable to interview. Patient's wife was at bedside throughout interview at patient's request. He is calm, cooperative and is linear and goal oriented although he does appear guarded. He recounts briefly about what brought him into the hospital and acknowledged that he made statements that were concerning for SI. He states that he made these statements out of anger and frustration when his mother and step father arrived. He states that he did not want his step father there because he "disrespected my dad in a recent argument they had. He denies SI/HI/AVH. He denies ever being suicidal, or ever having plan or intent. He denies receiving psychiatric care in the past and denies previous SA. He denies drug use although he had reported marijuana to primary team but UDS + benzos, cocaine, opiates, THC.    Collateral:   Spoke with wife preivately outside of room. Wife confirms that for the last 2-3 months Reggie and step father have been fighting because step father made negative comments about his biological father and since then Reggie has not wanted to see his stepfather. Wife states that Reggie has never been suicidal and has never voiced these feelings to her, she states "he isn't like that" and believes that Reggie made this comment out of anger. She does report a couple days prior to hospital admission she has suffered a miscarriage and then Reggie was upset as this was their second miscarriage. She stated that she was aware of the urine drug screen results and denies that Reggie would have purposely taken drugs and raises concern that his friends "put something in his drink". She " states that Reggie does not use drugs to her knowledge and that he is against using them as his father passed away from a drug overdose about 8 years ago.     Spoke with UMER at bedside who confirms tumultuous relationship with step dad and feels that patient made comment out of frustration. Denies that patient has ever voiced SI and has no concern for patient having SI.    Spoke with MIL and patient in room who confirm that in light of recent stressors (miscarriage) he may have ingested drugs, but he does not feel that he has a problem and does not feel like he would benefit from rehab    Psychiatric Review Of Systems - Is patient experiencing or having changes in:  sleep: yes, decreaed but states only since being in the hospital  appetite: yes, decreased since being in the hospital  weight: no  energy/anergy: yes, decreased since being in the hospital   interest/pleasure/anhedonia: no  somatic symptoms: yes  libido: did not assess  anxiety/panic: no  guilty/hopelessness: no  concentration: denied  S.I.B.s/risky behavior: denied  any drugs: denies but UDS +THC, BZD, opiates, cocaine  alcohol: rarely    History:     Past Medical/Surgical History  History reviewed. No pertinent past medical history.  History reviewed. No pertinent surgical history.    Past Psychiatric History:  Previous Medication Trials: no   Previous Psychiatric Hospitalizations: no   Previous Suicide Attempts: no   History of Violence: no  Outpatient Psychiatrist: no    Social History:  Marital Status:   Children: 1  Employment Status/Info: works in auto repair shop  Education: 2nd grade education then helped out in [email or web address] repair shop  Special Ed: unknown  Housing Status: with wife, 1 yo son, and brother  History of phys/sexual abuse: no  Access to gun: no    Substance Abuse History:  Recreational Drugs: denies but UDS + Bzd, cocaine, opiates, thc  Use of Alcohol: denied  Rehab History:no   Tobacco Use:no  Legal consequences of chemical use:  no    Legal History:  Past Charges/Incarcerations:unpaid parking tickets,    Pending charges:no     Family Psychiatric History:   denies

## 2018-12-16 NOTE — ED NOTES
Psych nursing rounds completed. No safety issues found. Sitter @ bedside, 1:1 maintained, charting every 15 mins. Via flowsheet.

## 2018-12-16 NOTE — ASSESSMENT & PLAN NOTE
No known history of CHF with most recent 2D ECHO showing EF 20%.   - Started on lisinopril 5mg (12/15/18) - will continue

## 2018-12-16 NOTE — NURSING
"1100 - Patient extubated, drowsy, all sedation discontinued per protocol. Patient on nasal cannula for short amount of time, weaned to room air after short amount of time.    1200 - patient with repeated agitation, requesting urinary catheter be discontinued. Discontinued per request, policy.     1300 - patient with increasing agitation, stating he "wishes the emergency room would have just left me dead". Stating repeatedly he "just wants to go home so I can die for real". When asking patient why he feels that way he states he "just wants to off myself for good this time". Cardiology team immediately notified, Charge nurse immediately notified.     1335 - PEC paperwork finalized, Everette MCMANUS, at bedside, while waiting on arrival of sitter. Family aware. Safety sweep of room completed with second RN, verified by charge nurse.    1338 - patient with large run of torsades de pointe noted, sustained, loss of consciousness noted after around 15 seconds. CPR immediately initiated while second RN obtained code cart. Patient with defibrillation pads already placed, as admitted with lethal arrhythmia in e.d. Defibrillation pads attached to zoll monitor as soon as possible, confirmed shockable rhythm, defib x1 with noted return to sinus rhythm, sinus bradycardia. Cardiology immediately at bedside.    Patient monitored closely throughout shift, electrolytes repleted, lidocaine gtt started per orders, central line placed at bedside for electrolyte replacement, as patient with significant complaints of pain at PIV site. See physician notation throughout shift.     VS as documented. Assessments as documented.    Brooklynn Garnett, DIOGENES  "

## 2018-12-16 NOTE — ASSESSMENT & PLAN NOTE
"Patient was endorsing suicidal thoughts upon extubating, saying he wanted to go home and "finish the job". Patient was PECed, and Psychiatry evaluated patient. Per Psych, rescind PEC as patient is no longer a threat to himself or others. He is no longer endorsing SI.   "

## 2018-12-16 NOTE — PROGRESS NOTES
"[facility]  Cardiology  Progress Note    Patient Name: Reggie To  MRN: [id number]  Admission Date: 12/14/2018  Hospital Length of Stay: 2 days  Code Status: Full Code   Attending Physician: Polo Patel MD   Primary Care Physician: No primary care provider on file.  Expected Discharge Date:   Principal Problem:Torsades de pointes    Subjective:     Hospital Course:   The next day, patient was successful extubated to room air. He did code in the later morning (see significant event note) where he became unresponsive and became hemodynamically unstable. He underwent less than 1 round of compressions and was successfully cardioverted. He had persistent PVCs on telemetry afterwards. Event was discussed with Dr. Barton, and it was agreed to start lidocaine drip as amiodarone prolongs the QTc (previous EKGs showed QTc 600-700). Metoprolol 5mg IV Q6H was also begun. Central line was place for medication administration.     Psychiatry was consulted as patient endorsed suicidal ideation, saying he wanted to leave AMA to "finish the job" (of killing himself). Patient was PECed, and bedside sitter and suicidal precautions were put in place.     Today, patient is having ectopy this morning. Lidocaine drip and metoprolol 5mg IV were continued. Psychiatry evaluated patient this morning and recommended to discontinue PEC as patient is not a threat to himself or others. They have suggested outpatient psychotherapy. EP recommended Life-vest upon discharge and TTE in 6 weeks in helping to determine whether ICD placement is necessary.     Interval History: Today, patient is having ectopy this morning. He endorses pain at the site of right IJ line placement. Psychiatry evaluated patient this morning and recommended to discontinue PEC as patient is not a threat to himself or others. He denies chest pain, palpitations, SOB, nausea, abdominal pain. He expresses that he would like to leave the hospital and go " home.    Review of Systems   Constitution: Negative for chills, fever, weakness and malaise/fatigue.   Cardiovascular: Positive for irregular heartbeat. Negative for chest pain, leg swelling, near-syncope, orthopnea, palpitations and paroxysmal nocturnal dyspnea.   Respiratory: Negative for cough and shortness of breath.    Gastrointestinal: Positive for abdominal pain. Negative for nausea and vomiting.   Neurological: Negative for dizziness, focal weakness, headaches and light-headedness.     Objective:     Vital Signs (Most Recent):  Temp: 99 °F (37.2 °C) (12/16/18 1100)  Pulse: 78 (12/16/18 1400)  Resp: (!) 29 (12/16/18 1400)  BP: (!) 125/59 (12/16/18 1400)  SpO2: 100 % (12/16/18 1400) Vital Signs (24h Range):  Temp:  [98.6 °F (37 °C)-99.2 °F (37.3 °C)] 99 °F (37.2 °C)  Pulse:  [] 78  Resp:  [6-38] 29  SpO2:  [94 %-100 %] 100 %  BP: (111-149)/(56-98) 125/59     Weight: 72.6 kg (160 lb)  Body mass index is 22.96 kg/m².     SpO2: 100 %  O2 Device (Oxygen Therapy): room air      Intake/Output Summary (Last 24 hours) at 12/16/2018 1416  Last data filed at 12/16/2018 1400  Gross per 24 hour   Intake 4031.5 ml   Output 500 ml   Net 3531.5 ml       Lines/Drains/Airways     Central Venous Catheter Line                 Percutaneous Central Line Insertion/Assessment - triple lumen  12/15/18 1703 right internal jugular less than 1 day          Peripheral Intravenous Line                 Peripheral IV - Single Lumen 12/14/18 1440 Left Antecubital 1 day                Physical Exam   Constitutional: He appears well-developed. No distress.   HENT:   Head: Normocephalic and atraumatic.   RIJ in place   Neck: Neck supple.   Cardiovascular: Normal rate and regular rhythm.   Pulmonary/Chest: Effort normal. No respiratory distress.   Abdominal: Soft. Bowel sounds are normal. He exhibits no distension. There is no tenderness.   Musculoskeletal: He exhibits no edema.   Neurological: He is alert.   Skin: Skin is warm.    Psychiatric: He expresses no suicidal ideation.       Significant Labs:   CMP   Recent Labs   Lab 12/14/18  1544 12/15/18  0437 12/15/18  1354 12/15/18  2109 12/16/18  0437    143 144 139 138   K 4.4 3.8 3.2* 4.0 3.9    110 111* 108 106   CO2 23 23 21* 23 24   * 80 101 76 82   BUN 15 16 16 14 13   CREATININE 1.2 1.1 1.3 1.0 0.9   CALCIUM 10.3 9.6 9.7 9.1 9.3   PROT 8.4 7.0  --   --  7.0   ALBUMIN 4.4 3.8  --   --  3.7   BILITOT 1.2* 0.9  --   --  1.0   ALKPHOS 120 94  --   --  95   AST 97* 89*  --   --  150*   ALT 78* 57*  --   --  79*   ANIONGAP 10 10 12 8 8   ESTGFRAFRICA >60.0 >60.0 >60.0 >60.0 >60.0   EGFRNONAA >60.0 >60.0 >60.0 >60.0 >60.0    and CBC   Recent Labs   Lab 12/15/18  0437 12/15/18  2109 12/16/18  0822   WBC 11.63 8.99 9.08   HGB 12.6* 12.5* 12.1*   HCT 38.4* 37.5* 36.7*    303 309       Significant Imaging:   CXR (12/15/18):   Since the previous exam, right central venous catheter has been placed, tip overlies the distal SVC.  Defibrillator pads project over the left hemothorax.  There is no pneumothorax or significant interval detrimental change in the cardiopulmonary status since the previous exam.  Endotracheal tube has been discontinued.    Assessment and Plan:     Brief HPI: 23M s/p cardiac arrest likely due to drug-induced vs congenital cardiomyopathy; IV metoprolol and lidocaine drip to control PVCs and runs of VTach    * Torsades de pointes    Patient initially presented with abdominal pain but went into PEA arrest and Torsade s/p DCCV and 15 minutes of CPR before ROSC. He received 4g of Magnesium, and a [facility] ruled out CAD. He was intubated, sedated, and admitted to CCU. Extubated today to room air. UDS showed presumptive positive for THC, cocaine, BZDs, opioids. 2D ECHO showed EF 20%.     Plan:  - Appreciate EP assistance - continue metoprolol 5mg IV Q6H and lidocaine drip (most likely wean drip tomorrow to 0.5)  - Monitor daily CMP/Mg with goal to keep Mg>2,  "K+>4  - EKGs PRN           Suicidal thoughts    Patient was endorsing suicidal thoughts upon extubating, saying he wanted to go home and "finish the job". Patient was PECed, and Psychiatry evaluated patient. Per Psych, rescind PEC as patient is no longer a threat to himself or others. He is no longer endorsing SI.      Acute on chronic systolic heart failure    No known history of CHF with most recent 2D ECHO showing EF 20%.   - Started on lisinopril 5mg (12/15/18) - will continue     Sudden cardiac arrest    Please refer to "torsades de pointes".     Leukocytosis    RESOVLED  Patient with WBC 17.38 on admission and lactate of 3.2 likely in setting of s/p cardiac arrest. Both have trended down to within normal limits. BC ordered NGTD. CXR shows no acute process. UA does not suggest UTI. Started on vanc and zosyn on admssion, but this was discontinued as infectious etiology is less likely.      Generalized abdominal pain    Patient initially presented with generalized abdominal pain with nausea and vomiting. He has a history of "bleeding ulcers". Abdominal x-ray showed no bowel distension, intramural gas, intra-portal gas or free peritoneal gas, however overlying the medial aspect of the gastric fundus is a 5 mm irregularly marginated calcification. Differentials include intra-abdominal infectious etiology vs perforated ulcer vs diverticulitis. Started on vancomycin and zosyn on admission, which were discontinued today. CT abdomen was negative for bleed.    Patient denies abdominal pain today. He is eating without any nausea or vomiting.         VTE Risk Mitigation (From admission, onward)        Ordered     enoxaparin injection 40 mg  Daily      12/15/18 1001          Venus Christianson MD PGY1  Cardiology  [facility]  "

## 2018-12-16 NOTE — ASSESSMENT & PLAN NOTE
"Patient initially presented with generalized abdominal pain with nausea and vomiting. He has a history of "bleeding ulcers". Abdominal x-ray showed no bowel distension, intramural gas, intra-portal gas or free peritoneal gas, however overlying the medial aspect of the gastric fundus is a 5 mm irregularly marginated calcification. Differentials include intra-abdominal infectious etiology vs perforated ulcer vs diverticulitis. Started on vancomycin and zosyn on admission, which were discontinued today. CT abdomen was negative for bleed.    Patient denies abdominal pain today. He is eating without any nausea or vomiting.  "

## 2018-12-16 NOTE — DISCHARGE SUMMARY
"  [facility]  Cardiology  Discharge Summary (AMA)      Patient Name: Reggie To  MRN: [id number]  Admission Date: 12/14/2018  Hospital Length of Stay: 2 days  Discharge Date and Time:  12/16/2018 5:59 PM  Attending Physician: No att. providers found    Discharging Provider: Venus Christianson MD  Primary Care Physician: No primary care provider on file.    HPI:   Mr. To is a 23 year old male who initially presented to the ED with complaint of acute onset abdominal pain. Patient woke up at 6:30 AM today with severe abdominal pain and nausea. Per ED note, he had 20 episodes of vomiting and 10-15 episodes of diarrhea with dark red stool. At that time, patient reported 10/10 intensity pain described as generalized abdominal cramping. While patient was in triage, patient lost his conscious and he was pulseless. CPR was started, and he had ROSC after 15 minutes. He was intubated and sedated.     Patient was accompanied by his wife and family who provided the history. Per wife, patient's most recent meal was yesterday evening consisting of pizza. Last night around 11:30, he complained of his hands and legs feeling "numb". This morning, he vomited and had a fever of 104.1. He reported "don't feel too good" and asked his wife to call an ambulance. He has a history of PVCs since 2016, but his mother has reported that he was "born with it". He had been on medications for this, but this was stopped about 7-8 months ago. He also has a history of "bleeding ulcers" since 2015 due to EtOH use. This apparently happens "every 1-2 years". He is on pills for this. Wife states that patient has had ED visits in the past for similar symptoms and improvement with Zofran and GI cocktail. He was last hospitalized for this about 2 years ago. Wife also said that he is non-compliant with medications and does not like taking medications that are prescribed. Per aunt, he has been having "flu-like illness" for past few weeks. He " "smokes half a pack a of cigarettes per day for the past 4 years. He has not had recent EtOH use. He uses marijuana, but it is unknown how much and how often. Patient and family are originally from [place], and patient primarily receives his care there. Per family, patient is in [place] visiting family.    He received Zofran, IV fluids, and one dose of Vancomycin and Cefepime in the ED.  Interventional cardiology performed a LHC which showed:  · TDP with cardiac arrest, CPR, ROSC, intubated.  · Normal coronaries  · Abdominal pain and fever, etiology yet determined.  · Estimated blood loss: none    Patient was admitted to the CMICU for further evaluation and management.     Procedure(s) (LRB):  Left heart cath (Left)     Indwelling Lines/Drains at time of discharge:  Lines/Drains/Airways     Central Venous Catheter Line                 Percutaneous Central Line Insertion/Assessment - triple lumen  12/15/18 1703 right internal jugular 1 day                Hospital Course:  The next day, patient was successful extubated to room air. Urine drug screen was presumptive positive for opioids, BZDs, THC, and cocaine. He did code in the later morning (see significant event note) where he became unresponsive and became hemodynamically unstable. He underwent less than 1 round of compressions and was successfully cardioverted. He had persistent PVCs with 3-4 seconds of VTach on telemetry afterwards. Event was discussed with Dr. Barton, and it was agreed to start lidocaine drip as amiodarone prolongs the QTc (previous EKGs showed QTc 600-700). Metoprolol 5mg IV Q6H was also begun. Central line was place for medication administration.     Psychiatry was consulted as patient endorsed suicidal ideation, saying he wanted to leave AMA to "finish the job" (of killing himself). Patient was PECed, and bedside sitter and suicidal precautions were put in place.     Today, patient is having ectopy this morning. Lidocaine drip and " metoprolol 5mg IV were continued as it helped to decrease the frequency of PVCs and runs of Vtach. Psychiatry evaluated patient this morning and recommended to discontinue PEC as patient is not a threat to himself or others. They have suggested outpatient psychotherapy as patient is dealing with a lot of stressors at home. EP recommended [facility] upon discharge and TTE in 6 weeks in helping to determine whether ICD placement will be necessary.    As patient's urine drug screen was positive, and it is not known exactly what the patient ingested, it is thought that patient's arrhythmias may be drug-induced. A congenital etiology such as a structural or functional abnormality is also a possibility given that patient has had a prior history of PVCs and an unclear cardiac medical history. Acute myocarditis and electrolyte disturbances were also considered in the differential but given patient's electrolytes were relatively normal on admission and positive drug use, it is most likely the cause of his cardiac arrhthymias.     On hospital day 3 (day of AMA discharge), patient expressed his wishes to leave the hospital. Despite the team discussing the risks, which included further arrhthymias, cardiac arrest and death, of leaving the hospital at this time, patient adamantly re-instated that he wanted to home. He understood the risks of leaving AMA and filled out the AMA paperwork. Central line was removed. He was discharged AMA.     Consults:   Consults (From admission, onward)        Status Ordering Provider     Inpatient consult to Cardiology  Once     Provider:  (Not yet assigned)    Completed YUDY GREEN     Inpatient consult to Psychiatry  Once     Provider:  (Not yet assigned)    Completed ANTIONETTE MARINELLI     Inpatient consult to Registered Dietitian/Nutritionist  Once     Provider:  (Not yet assigned)    Completed SHAUNA YANCEY          Final Active Diagnoses:    Diagnosis Date Noted POA    PRINCIPAL PROBLEM:   Torsades de pointes [I47.2] 12/14/2018 Yes    Suicidal thoughts [R45.851] 12/16/2018 Not Applicable    Adjustment disorder, unspecified [F43.20] 12/16/2018 Clinically Undetermined    Cannabis abuse [F12.10] 12/16/2018 Yes     Chronic    Acute on chronic systolic heart failure [I50.23] 12/15/2018 Yes    Generalized abdominal pain [R10.84] 12/14/2018 Yes    Leukocytosis [D72.829] 12/14/2018 Yes    Sudden cardiac arrest [I46.9] 12/14/2018 Yes      Problems Resolved During this Admission:    Diagnosis Date Noted Date Resolved POA    Lactic acidosis [E87.2] 12/14/2018 12/16/2018 Yes    Hyperglycemia [R73.9] 12/14/2018 12/16/2018 Yes    Acute respiratory failure with hypercapnia [J96.02] 12/14/2018 12/16/2018 Yes     Discharged Condition: critical    Disposition: Left Against Medical Adv*    Follow Up: Patient was encouraged to follow up with PCP and/or cardiologist back home in [place]    Medications:  Reconciled Home Medications:      Medication List      You have not been prescribed any medications.     Patient was encouraged to follow up with outpatient PCP and/or cardiologist regarding medications. Per patient's family, he has been known to be non-compliant with medications.    Time spent on the discharge of patient: 45 minutes    Venus Christianson MD PGY1  Cardiology  [facility]

## 2018-12-16 NOTE — ASSESSMENT & PLAN NOTE
"Patient initially presented with generalized abdominal pain with nausea and vomiting. He has a history of "bleeding ulcers". Abdominal x-ray showed no bowel distension, intramural gas, intra-portal gas or free peritoneal gas, however overlying the medial aspect of the gastric fundus is a 5 mm irregularly marginated calcification. Differentials include intra-abdominal infectious etiology vs perforated ulcer vs diverticulitis. Started on vancomycin and zosyn on admission, which were discontinued today. CT abdomen was negative for bleed.    Plan:  - Consider surgical consult as patient initially presented with abdominal pain and continues to endorse upon extubation.  "

## 2018-12-16 NOTE — SUBJECTIVE & OBJECTIVE
Patient History           Medical as of 12/16/2018    Past Medical History: Patient provided no pertinent medical history.           Surgical as of 12/16/2018    Past Surgical History: Patient provided no pertinent surgical history.           Family as of 12/16/2018    None           Tobacco Use as of 12/16/2018     Smoking Status Smoking Start Date Smoking Quit Date Packs/Day Years Used    Current Every Day Smoker -- -- 0.50 --    Types Comments Smokeless Tobacco Status Smokeless Tobacco Quit Date Source     Cigarettes -- Never Used -- Provider            Alcohol Use as of 12/16/2018     Alcohol Use Drinks/Week Alcohol/Week Comments Source    No -- -- -- Provider    Frequency Standard Drinks Binge Drinking Source      Never -- -- Provider             Drug Use as of 12/16/2018     Drug Use Types Frequency Comments Source    No -- -- -- Provider            Sexual Activity as of 12/16/2018     Sexually Active Birth Control Partners Comments Source    -- -- -- -- Provider            Activities of Daily Living as of 12/16/2018    None           Social Documentation as of 12/16/2018    None           Occupational as of 12/16/2018    None           Socioeconomic as of 12/16/2018     Marital Status Spouse Name Number of Children Years Education Education Level Preferred Language Ethnicity Race Source     -- -- -- -- English /White White --    Financial Resource Strain Food Insecurity: Worry Food Insecurity: Inability Transportation Needs: Medical Transportation Needs: Non-medical       -- -- -- -- --             Pertinent History     Question Response Comments    Lives with -- --    Place in Birth Order -- --    Lives in -- --    Number of Siblings -- --    Raised by -- --    Legal Involvement -- --    Childhood Trauma -- --    Criminal History of -- --    Financial Status -- --    Highest Level of Education -- --    Does patient have access to a firearm? -- --     Service -- --    Primary  "Leisure Activity -- --    Spirituality -- --        History reviewed. No pertinent past medical history.  History reviewed. No pertinent surgical history.  Family History     None        Tobacco Use    Smoking status: Current Every Day Smoker     Packs/day: 0.50     Types: Cigarettes    Smokeless tobacco: Never Used   Substance and Sexual Activity    Alcohol use: No     Frequency: Never    Drug use: No    Sexual activity: Not on file     Review of patient's allergies indicates:  No Known Allergies    No current facility-administered medications on file prior to encounter.      No current outpatient medications on file prior to encounter.     Psychotherapeutics (From admission, onward)    Start     Stop Route Frequency Ordered    12/15/18 1519  lorazepam injection 1 mg      -- IV Every 4 hours PRN 12/15/18 1419    12/15/18 1030  dexmedetomidine (PRECEDEX) 400mcg/100mL 0.9% NaCL infusion     Question Answer Comment   Titrate by (in mcg/kg/hr): 0.1    Titrate interval: (in minutes) 5    To maintain a RASS score of: RASS (0) Alert and calm    Maximum dose of (in mcg/kg/hr): 0.5        -- IV Continuous 12/15/18 0926        Review of Systems   Constitutional: Positive for appetite change and fatigue.   Musculoskeletal: Positive for neck pain.   Neurological: Negative for facial asymmetry.   Psychiatric/Behavioral: Positive for sleep disturbance.     Strengths and Liabilities: Strength: Patient accepts guidance/feedback, Strength: Patient has positive support network.    Objective:     Vital Signs (Most Recent):  Temp: 98.9 °F (37.2 °C) (12/16/18 0800)  Pulse: 91 (12/16/18 1000)  Resp: (!) 33 (12/16/18 1000)  BP: 136/68 (12/16/18 1000)  SpO2: 100 % (12/16/18 1000) Vital Signs (24h Range):  Temp:  [98.6 °F (37 °C)-99.2 °F (37.3 °C)] 98.9 °F (37.2 °C)  Pulse:  [] 91  Resp:  [6-52] 33  SpO2:  [94 %-100 %] 100 %  BP: (111-230)/() 136/68     Height: 5' 10" (177.8 cm)  Weight: 72.6 kg (160 lb)  Body mass index " "is 22.96 kg/m².      Intake/Output Summary (Last 24 hours) at 12/16/2018 1109  Last data filed at 12/16/2018 1000  Gross per 24 hour   Intake 3166.5 ml   Output 500 ml   Net 2666.5 ml       Physical Exam   Psychiatric:   Appearance: good grooming, dressed in hospital gown, NAD, appears stated age  Behavior/Cooperation:  Calm, cooperative,   Language: fluent english  Speech: normal tone, normal rate, normal pitch, normal volume  Mood: "tired"  Affect: constricted but reactive, appropriate  Thought Process: linear, logical, goal oriented  Thought Content:  Denies SI/HI/paranoia/delusions. No objective evidence of paranoia or delusions.  Perception: Denies AVH. No objective evidence of AVH   Orientation: oriented to person, place, year, month, situation, president  Memory: intact to conversation, remote intact, recent intact. Registers 3/3, able to recall 2/3 after 3 minutes  Attention Span/Concentration: Intact to conversation.   Fund of knowledge: Able to recall 3/3 last presidents in order. Appropriate for education level  Cognition: good  Insight: fair  Judgment: fair            Significant Labs: All pertinent labs within the past 24 hours have been reviewed.    Significant Imaging: I have reviewed all pertinent imaging results/findings within the past 24 hours.  "

## 2018-12-16 NOTE — SUBJECTIVE & OBJECTIVE
Interval History: Yesterday, after pt was extubated, runs of PMVT seen on telemetry. Degenerated to TdP. Pt s/p DCCV x1. Started on IV lidocaine drip and IV MTP. Seen by psychiatry, pt endorsed SI so sitter placed. This AM, pt reports neck pain at the central line site in RIJ.     Review of Systems   Constitution: Negative for chills, fever, weakness and malaise/fatigue.   Cardiovascular: Positive for irregular heartbeat. Negative for chest pain, leg swelling, near-syncope, orthopnea, palpitations and paroxysmal nocturnal dyspnea.   Respiratory: Negative for cough and shortness of breath.    Gastrointestinal: Positive for abdominal pain. Negative for nausea and vomiting.   Neurological: Negative for dizziness, focal weakness, headaches and light-headedness.     Objective:     Vital Signs (Most Recent):  Temp: 98.9 °F (37.2 °C) (12/16/18 0800)  Pulse: 91 (12/16/18 1000)  Resp: (!) 33 (12/16/18 1000)  BP: 136/68 (12/16/18 1000)  SpO2: 100 % (12/16/18 1000) Vital Signs (24h Range):  Temp:  [98.6 °F (37 °C)-99.2 °F (37.3 °C)] 98.9 °F (37.2 °C)  Pulse:  [] 91  Resp:  [6-52] 33  SpO2:  [94 %-100 %] 100 %  BP: (111-230)/() 136/68     Weight: 72.6 kg (160 lb)  Body mass index is 22.96 kg/m².     SpO2: 100 %  O2 Device (Oxygen Therapy): room air    Physical Exam   Constitutional: He is oriented to person, place, and time.   HENT:   Mouth/Throat: Oropharynx is clear and moist.   Eyes: Pupils are equal, round, and reactive to light.   Neck: Neck supple. No JVD present.   Cardiovascular: Normal rate.   Pulmonary/Chest: Effort normal.   Abdominal: Soft.   Musculoskeletal: He exhibits no edema.   Neurological: He is alert and oriented to person, place, and time.   Skin: Skin is warm and dry.       Significant Labs: All pertinent lab results from the last 24 hours have been reviewed.    Significant Imaging: Echocardiogram: 2D echo with color flow doppler: No results found for this or any previous visit.

## 2018-12-16 NOTE — ASSESSMENT & PLAN NOTE
Patient initially presented with abdominal pain but went into PEA arrest and Torsade s/p DCCV and 15 minutes of CPR before ROSC. He received 4g of Magnesium, and a [facility] ruled out CAD. He was intubated, sedated, and admitted to CCU. Extubated today to room air. UDS showed presumptive positive for THC, cocaine, BZDs, opioids. 2D ECHO showed EF 20%.     Plan:  - Appreciate EP assistance - continue metoprolol 5mg IV Q6H and lidocaine drip (most likely wean drip tomorrow to 0.5)  - Monitor daily CMP/Mg with goal to keep Mg>2, K+>4  - EKGs PRN

## 2018-12-16 NOTE — ASSESSMENT & PLAN NOTE
Patient initially presented with abdominal pain but went into PEA arrest and Torsade s/p DCCV and 15 minutes of CPR before ROSC. He received 4g of Magnesium, and a [facility] ruled out CAD. He was intubated, sedated, and admitted to CCU. Extubated today to room air. UDS showed presumptive positive for THC, cocaine, BZDs, opioids. 2D ECHO showed EF 20%.     Plan:  - Spoke with EP, who advised to begin metoprolol 5mg IV Q6H and lidocaine drip  - Monitor daily CMP/Mg with goal to keep Mg>2, K+>4  - EKGs PRN  - Will try to obtain medical records from [place] regarding cardiac and medical history

## 2018-12-16 NOTE — CONSULTS
"[facility]  Psychiatry  Consult Note    Patient Name: Reggie To  MRN: [id number]   Code Status: Full Code  Admission Date: 2018  Hospital Length of Stay: 2 days  Attending Physician: Polo Patel MD  Primary Care Provider: No primary care provider on file.    Current Legal Status: [facility]    Patient information was obtained from patient, spouse/SO, parent and ER records.   Inpatient consult to Psychiatry  Consult performed by: Brittany Rodriguez MD  Consult ordered by: Venus Christianson MD        Subjective:     Principal Problem:Torsades de pointes    Chief Complaint:  Concern for SI    HPI:   Reggie To  : 1995  MRN: [id number]    Consultation-Liaison Psychiatry Consult Note    Chief Complaint / Reason for Consult:     suicidal ideation     Subjective:     History of Present Illness:   Reggie To is a 23 y.o. male with no past psychiatric  History  who [place] [facility] [place].    Per primary team  HPI:  Mr. To is a 23 year old male who initially presented to the ED with complaint of acute onset abdominal pain. Patient woke up at 6:30 AM today with severe abdominal pain and nausea. Per ED note, he had 20 episodes of vomiting and 10-15 episodes of diarrhea with dark red stool. At that time, patient reported 10/10 intensity pain described as generalized abdominal cramping. While patient was in triage, patient lost his conscious and he was pulseless. CPR was started, and he had ROSC after 15 minutes. He was intubated and sedated.     Patient was accompanied by his wife and family who provided the history. Per wife, patient's most recent meal was yesterday evening consisting of pizza. Last night around 11:30, he complained of his hands and legs feeling "numb". This morning, he vomited and had a fever of 104.1. He reported "don't feel too good" and asked his wife to call an ambulance. He has a history of PVCs since 2016, but his mother has reported that he was "born with it". He " "had been on medications for this, but this was stopped about 7-8 months ago. He also has a history of "bleeding ulcers" since 2015 due to EtOH use. This apparently happens "every 1-2 years". He is on pills for this. Wife states that patient has had ED visits in the past for similar symptoms and improvement with Zofran and GI cocktail. He was last hospitalized for this about 2 years ago. Wife also said that he is non-compliant with medications and does not like taking medications that are prescribed. Per aunt, he has been having "flu-like illness" for past few weeks. He smokes half a pack a of cigarettes per day for the past 4 years. He has not had recent EtOH use. He uses marijuana, but it is unknown how much and how often. Patient and family are originally from [place], and patient primarily receives his care there. Per family, patient is in [place] visiting family.       Psychiatric Evaluation  Upon entering room patient is laying in bed comfortable and is agreeable to interview. Patient's wife was at bedside throughout interview at patient's request. He is calm, cooperative and is linear and goal oriented although he does appear guarded. He recounts briefly about what brought him into the hospital and acknowledged that he made statements that were concerning for SI. He states that he made these statements out of anger and frustration when his mother and step father arrived. He states that he did not want his step father there because he "disrespected my dad in a recent argument they had. He denies SI/HI/AVH. He denies ever being suicidal, or ever having plan or intent. He denies receiving psychiatric care in the past and denies previous SA. He denies drug use although he had reported marijuana to primary team but UDS + benzos, cocaine, opiates, THC.    Collateral:   Spoke with wife preivately outside of room. Wife confirms that for the last 2-3 months Reggie and step father have been fighting because step father " "made negative comments about his biological father and since then Reggie has not wanted to see his stepfather. Wife states that Reggie has never been suicidal and has never voiced these feelings to her, she states "he isn't like that" and believes that Reggie made this comment out of anger. She does report a couple days prior to hospital admission she has suffered a miscarriage and then Reggie was upset as this was their second miscarriage. She stated that she was aware of the urine drug screen results and denies that Reggie would have purposely taken drugs and raises concern that his friends "put something in his drink". She states that Reggie does not use drugs to her knowledge and that he is against using them as his father passed away from a drug overdose about 8 years ago.     Spoke with UMER at bedside who confirms tumultuous relationship with step dad and feels that patient made comment out of frustration. Denies that patient has ever voiced SI and has no concern for patient having SI.    Spoke with MIL and patient in room who confirm that in light of recent stressors (miscarriage) he may have ingested drugs, but he does not feel that he has a problem and does not feel like he would benefit from rehab    Psychiatric Review Of Systems - Is patient experiencing or having changes in:  sleep: yes, decreaed but states only since being in the hospital  appetite: yes, decreased since being in the hospital  weight: no  energy/anergy: yes, decreased since being in the hospital   interest/pleasure/anhedonia: no  somatic symptoms: yes  libido: did not assess  anxiety/panic: no  guilty/hopelessness: no  concentration: denied  S.I.B.s/risky behavior: denied  any drugs: denies but UDS +THC, BZD, opiates, cocaine  alcohol: rarely    History:     Past Medical/Surgical History  History reviewed. No pertinent past medical history.  History reviewed. No pertinent surgical history.    Past Psychiatric History:  Previous Medication Trials: no "   Previous Psychiatric Hospitalizations: no   Previous Suicide Attempts: no   History of Violence: no  Outpatient Psychiatrist: no    Social History:  Marital Status:   Children: 1  Employment Status/Info: works in auto repair shop  Education: 2nd grade education then helped out in auto repair shop  Special Ed: unknown  Housing Status: with wife, 1 yo son, and brother  History of phys/sexual abuse: no  Access to gun: no    Substance Abuse History:  Recreational Drugs: denies but UDS + Bzd, cocaine, opiates, thc  Use of Alcohol: denied  Rehab History:no   Tobacco Use:no  Legal consequences of chemical use: no    Legal History:  Past Charges/Incarcerations:unpaid parking tickets,    Pending charges:no     Family Psychiatric History:   denies        Hospital Course: No notes on file         Patient History           Medical as of 12/16/2018    Past Medical History: Patient provided no pertinent medical history.           Surgical as of 12/16/2018    Past Surgical History: Patient provided no pertinent surgical history.           Family as of 12/16/2018    None           Tobacco Use as of 12/16/2018     Smoking Status Smoking Start Date Smoking Quit Date Packs/Day Years Used    Current Every Day Smoker -- -- 0.50 --    Types Comments Smokeless Tobacco Status Smokeless Tobacco Quit Date Source     Cigarettes -- Never Used -- Provider            Alcohol Use as of 12/16/2018     Alcohol Use Drinks/Week Alcohol/Week Comments Source    No -- -- -- Provider    Frequency Standard Drinks Binge Drinking Source      Never -- -- Provider             Drug Use as of 12/16/2018     Drug Use Types Frequency Comments Source    No -- -- -- Provider            Sexual Activity as of 12/16/2018     Sexually Active Birth Control Partners Comments Source    -- -- -- -- Provider            Activities of Daily Living as of 12/16/2018    None           Social Documentation as of 12/16/2018    None           Occupational as of 12/16/2018     None           Socioeconomic as of 12/16/2018     Marital Status Spouse Name Number of Children Years Education Education Level Preferred Language Ethnicity Race Source     -- -- -- -- English /White White --    Financial Resource Strain Food Insecurity: Worry Food Insecurity: Inability Transportation Needs: Medical Transportation Needs: Non-medical       -- -- -- -- --             Pertinent History     Question Response Comments    Lives with -- --    Place in Birth Order -- --    Lives in -- --    Number of Siblings -- --    Raised by -- --    Legal Involvement -- --    Childhood Trauma -- --    Criminal History of -- --    Financial Status -- --    Highest Level of Education -- --    Does patient have access to a firearm? -- --     Service -- --    Primary Leisure Activity -- --    Spirituality -- --        History reviewed. No pertinent past medical history.  History reviewed. No pertinent surgical history.  Family History     None        Tobacco Use    Smoking status: Current Every Day Smoker     Packs/day: 0.50     Types: Cigarettes    Smokeless tobacco: Never Used   Substance and Sexual Activity    Alcohol use: No     Frequency: Never    Drug use: No    Sexual activity: Not on file     Review of patient's allergies indicates:  No Known Allergies    No current facility-administered medications on file prior to encounter.      No current outpatient medications on file prior to encounter.     Psychotherapeutics (From admission, onward)    Start     Stop Route Frequency Ordered    12/15/18 1519  lorazepam injection 1 mg      -- IV Every 4 hours PRN 12/15/18 1419    12/15/18 1030  dexmedetomidine (PRECEDEX) 400mcg/100mL 0.9% NaCL infusion     Question Answer Comment   Titrate by (in mcg/kg/hr): 0.1    Titrate interval: (in minutes) 5    To maintain a RASS score of: RASS (0) Alert and calm    Maximum dose of (in mcg/kg/hr): 0.5        -- IV Continuous 12/15/18 09        Review of  "Systems   Constitutional: Positive for appetite change and fatigue.   Musculoskeletal: Positive for neck pain.   Neurological: Negative for facial asymmetry.   Psychiatric/Behavioral: Positive for sleep disturbance.     Strengths and Liabilities: Strength: Patient accepts guidance/feedback, Strength: Patient has positive support network.    Objective:     Vital Signs (Most Recent):  Temp: 98.9 °F (37.2 °C) (12/16/18 0800)  Pulse: 91 (12/16/18 1000)  Resp: (!) 33 (12/16/18 1000)  BP: 136/68 (12/16/18 1000)  SpO2: 100 % (12/16/18 1000) Vital Signs (24h Range):  Temp:  [98.6 °F (37 °C)-99.2 °F (37.3 °C)] 98.9 °F (37.2 °C)  Pulse:  [] 91  Resp:  [6-52] 33  SpO2:  [94 %-100 %] 100 %  BP: (111-230)/() 136/68     Height: 5' 10" (177.8 cm)  Weight: 72.6 kg (160 lb)  Body mass index is 22.96 kg/m².      Intake/Output Summary (Last 24 hours) at 12/16/2018 1109  Last data filed at 12/16/2018 1000  Gross per 24 hour   Intake 3166.5 ml   Output 500 ml   Net 2666.5 ml       Physical Exam   Psychiatric:   Appearance: good grooming, dressed in hospital gown, NAD, appears stated age  Behavior/Cooperation:  Calm, cooperative,   Language: fluent english  Speech: normal tone, normal rate, normal pitch, normal volume  Mood: "tired"  Affect: constricted but reactive, appropriate  Thought Process: linear, logical, goal oriented  Thought Content:  Denies SI/HI/paranoia/delusions. No objective evidence of paranoia or delusions.  Perception: Denies AVH. No objective evidence of AVH   Orientation: oriented to person, place, year, month, situation, president  Memory: intact to conversation, remote intact, recent intact. Registers 3/3, able to recall 2/3 after 3 minutes  Attention Span/Concentration: Intact to conversation.   Fund of knowledge: Able to recall 3/3 last presidents in order. Appropriate for education level  Cognition: good  Insight: fair  Judgment: fair            Significant Labs: All pertinent labs within the past 24 " hours have been reviewed.    Significant Imaging: I have reviewed all pertinent imaging results/findings within the past 24 hours.    Assessment/Plan:     Suicidal thoughts    -psychiatry was consulted for concern for SI as patient made comments that were concerning yesterday. On evaluation today pt denies SI, h/o SI/SA. Patient states that he was frustrated with his step father and believed that when his mother came to visit his step father was there so he made comments that could be misconstrued out of frustration. Wife and MIL were spoken to separately who confirm that patient has tumultuous relationship with stepfather and they do not believe that patient is suicidal or has ever been. Pt denies SI/HI/AVH. Denies sx of depression. Pt admits that he may have taken drugs 2/2 recent stressors but denies SI or that it was a SA. He denies that he has a drug problem and does not think he would benefit from rehab at this time  -rec to discontinue PEC at this time as patient is not in danger to self/others/gravely disabled  -psychiatry will sign off at this time  -should patient be interested in rehab, you may supply him with the following information:    Outpatient Drug Rehab   - [facility]: [facility], [address]th Floor, [address], [phone]  - Alcoholics Anonymous: [email or web address], 24 Hr Hotline:578.683.1923, Main: [phone]  - [facility]: [address] [facility], [phone], Tuesdays at 10am, Nando Salcido (ext. [phone])  - [facility]: [address], [phone]  - [facility]: [address] [facility] [address], [phone]  - [facility]: [address], [phone]    Inpatient Drug Rehab  - [facility]: [address] [facility], [phone]   - [facility]: [address] [facility], [phone]   - [facility]: [address], [place], [phone]  - [facility]: [phone]  - [facility] <females only> ([employer]): [address] [facility], [phone], ext [phone], [address]  - [facility] <Fee based>: [address] [place], [phone]                            Total Time:  60 minutes      Brittany Rodriguez MD   Psychiatry  [facility]

## 2018-12-16 NOTE — PLAN OF CARE
Problem: Adult Inpatient Plan of Care  Goal: Plan of Care Review  Outcome: Ongoing (interventions implemented as appropriate)  No acute events throughout shift. Pt remains on lidocaine gtt, SR with PVCs. No other issues during shift.  VS and assessment per flow sheet, patient progressing towards goals as tolerated, plan of care reviewed with Reggie To and family, all concerns addressed, will continue to monitor.

## 2018-12-16 NOTE — ASSESSMENT & PLAN NOTE
RESOVLED  Patient with WBC 17.38 on admission and lactate of 3.2 likely in setting of s/p cardiac arrest. Both have trended down to within normal limits. BC ordered NGTD. CXR shows no acute process. UA does not suggest UTI. Started on vanc and zosyn on admssion, but this was discontinued as infectious etiology is less likely.

## 2018-12-16 NOTE — ASSESSMENT & PLAN NOTE
-S/p two cardiac arrest (initial one on 12/14 and then on 12/15)  -TdP seen on telemetry as both causes of cardiac arrest (PVC on prolonged QT interval, R on T phenomenon)   -TTE showed EF 20 %, has NICM (?Of PVC induced CM vs substance induced CM)  -LHC showed normal coronaries  -EKG shows prolonged QTc interval however UDS positive for BZD, opiate, MJ and cocaine  -recommend continuing IV lidocaine drip at 1 mg/min tonight and then weaning to 0.5 mg/min tomorrow AM  -recommend toxicology consult and further investigational studies to evaluate for other substance abuse (TCA's, sedatives, etc)   -discussed with pt and family, recommend Life Vest on hospital discharge and repeat TTE O/P in 6 weeks, at the time pt will follow up with Cardiology clinic to determine if EF improved/also UDS will need to be repeated, and discussion if ICD implantation is needed  -keep Magnesium >2 , potassium >4  -EP team will continue to follow

## 2018-12-16 NOTE — SUBJECTIVE & OBJECTIVE
Interval History: Today, patient is having ectopy this morning. He endorses pain at the site of right IJ line placement. Psychiatry evaluated patient this morning and recommended to discontinue PEC as patient is not a threat to himself or others. He denies chest pain, palpitations, SOB, nausea, abdominal pain. He expresses that he would like to leave the hospital and go home.    Review of Systems   Constitution: Negative for chills, fever, weakness and malaise/fatigue.   Cardiovascular: Positive for irregular heartbeat. Negative for chest pain, leg swelling, near-syncope, orthopnea, palpitations and paroxysmal nocturnal dyspnea.   Respiratory: Negative for cough and shortness of breath.    Gastrointestinal: Positive for abdominal pain. Negative for nausea and vomiting.   Neurological: Negative for dizziness, focal weakness, headaches and light-headedness.     Objective:     Vital Signs (Most Recent):  Temp: 99 °F (37.2 °C) (12/16/18 1100)  Pulse: 78 (12/16/18 1400)  Resp: (!) 29 (12/16/18 1400)  BP: (!) 125/59 (12/16/18 1400)  SpO2: 100 % (12/16/18 1400) Vital Signs (24h Range):  Temp:  [98.6 °F (37 °C)-99.2 °F (37.3 °C)] 99 °F (37.2 °C)  Pulse:  [] 78  Resp:  [6-38] 29  SpO2:  [94 %-100 %] 100 %  BP: (111-149)/(56-98) 125/59     Weight: 72.6 kg (160 lb)  Body mass index is 22.96 kg/m².     SpO2: 100 %  O2 Device (Oxygen Therapy): room air      Intake/Output Summary (Last 24 hours) at 12/16/2018 1416  Last data filed at 12/16/2018 1400  Gross per 24 hour   Intake 4031.5 ml   Output 500 ml   Net 3531.5 ml       Lines/Drains/Airways     Central Venous Catheter Line                 Percutaneous Central Line Insertion/Assessment - triple lumen  12/15/18 1703 right internal jugular less than 1 day          Peripheral Intravenous Line                 Peripheral IV - Single Lumen 12/14/18 1440 Left Antecubital 1 day                Physical Exam   Constitutional: He appears well-developed. No distress.   HENT:   Head:  Normocephalic and atraumatic.   RIJ in place   Neck: Neck supple.   Cardiovascular: Normal rate and regular rhythm.   Pulmonary/Chest: Effort normal. No respiratory distress.   Abdominal: Soft. Bowel sounds are normal. He exhibits no distension. There is no tenderness.   Musculoskeletal: He exhibits no edema.   Neurological: He is alert.   Skin: Skin is warm.   Psychiatric: He expresses no suicidal ideation.       Significant Labs:   CMP   Recent Labs   Lab 12/14/18  1544 12/15/18  0437 12/15/18  1354 12/15/18  2109 12/16/18  0437    143 144 139 138   K 4.4 3.8 3.2* 4.0 3.9    110 111* 108 106   CO2 23 23 21* 23 24   * 80 101 76 82   BUN 15 16 16 14 13   CREATININE 1.2 1.1 1.3 1.0 0.9   CALCIUM 10.3 9.6 9.7 9.1 9.3   PROT 8.4 7.0  --   --  7.0   ALBUMIN 4.4 3.8  --   --  3.7   BILITOT 1.2* 0.9  --   --  1.0   ALKPHOS 120 94  --   --  95   AST 97* 89*  --   --  150*   ALT 78* 57*  --   --  79*   ANIONGAP 10 10 12 8 8   ESTGFRAFRICA >60.0 >60.0 >60.0 >60.0 >60.0   EGFRNONAA >60.0 >60.0 >60.0 >60.0 >60.0    and CBC   Recent Labs   Lab 12/15/18  0437 12/15/18  2109 12/16/18  0822   WBC 11.63 8.99 9.08   HGB 12.6* 12.5* 12.1*   HCT 38.4* 37.5* 36.7*    303 309       Significant Imaging:   CXR (12/15/18):   Since the previous exam, right central venous catheter has been placed, tip overlies the distal SVC.  Defibrillator pads project over the left hemothorax.  There is no pneumothorax or significant interval detrimental change in the cardiopulmonary status since the previous exam.  Endotracheal tube has been discontinued.

## 2018-12-16 NOTE — NURSING
"1645 - patient with increasing agitation throughout shift regarding wanting to "go outside to smoke a cigarette". Patient repeatedly made aware that he is not able to leave the floor to smoke a cigarette while an inpatient. Dr SURY Patel, patient's attending, in room with patient thoroughly informing patient of his rights, along with the intended plan of care, the risks of leaving the floor, and the adamant advice against leaving prior to treatment being  completed adequately. This nurse and second nurse, ROSCOE Fuentes RN, present, along with patient's spouse, Ranjana, along with patient's father in law, mother in law, and brother, for conversation, and for patient's confirmation that he wishes to leave the hospital against medical advice at this time.     1700 - witnessed physician via formal form discussing significant risk with patient in relation to leaving hospital prior to completion of full medical treatment.     1728 - CVC removed from right IJ, sutures removed, pressure held x 10 minutes to achieve hemostasis, dressing applied to site. PIV removed from left AC, dressing applied to site. Cardiac monitoring removed, all VS monitoring removed. Patient, again, adamant that he wishes to leave the hospital against medical advice. Witnessed patient signing form confirming he wishes to leave against medical advice. Second nurse present for patient acknowledgement of confirmation to leave AMA, see form in patient's medical records. Patient's personal belongings returned to him from locked cabinet. All medications discontinued just prior to removal of central line. Patient informed to return to emergency facility or call EMS if ever needed.     1730 - Patient exit from CMICU/facility in no apparent distress, ambulating, with family at side. All belongings given to family upon leaving. Charge Nurse aware of patient exit, AMA, to notify Nursing Supervisor.    All VS as documented. Assessments as documented.     Brooklynn MALHOTRA" Mariola, DIOGENES

## 2018-12-16 NOTE — ASSESSMENT & PLAN NOTE
-psychiatry was consulted for concern for SI as patient made comments that were concerning yesterday. On evaluation today pt denies SI, h/o SI/SA. Patient states that he was frustrated with his step father and believed that when his mother came to visit his step father was there so he made comments that could be misconstrued out of frustration. Wife and MIL were spoken to separately who confirm that patient has tumultuous relationship with stepfather and they do not believe that patient is suicidal or has ever been. Pt denies SI/HI/AVH. Denies sx of depression. Pt admits that he may have taken drugs 2/2 recent stressors but denies SI or that it was a SA. He denies that he has a drug problem and does not think he would benefit from rehab at this time  -rec to discontinue PEC at this time as patient is not in danger to self/others/gravely disabled  -psychiatry will sign off at this time  -should patient be interested in rehab, you may supply him with the following information:    Outpatient Drug Rehab   - [facility]: [facility], 4th Floor, [address], [phone]  - Alcoholics Anonymous: [email or web address], 24 Hr Hotline:959.490.7213, Main: [phone]  - [facility]: [address] [facility], [phone], Tuesdays at 10am, Nando Omari (ext. [phone])  - [facility]: [address], [phone]  - [facility]: [address] [facility] [address], [phone]  - [facility]: [address], [phone]    Inpatient Drug Rehab  - [facility]: [address] [facility], [phone]   - [facility]: [address] [facility], [phone]   - [facility]: [address], [place], [phone]  - [facility]: [phone]  - [facility] <females only> ([facility]): [address] [facility] [phone], ext [phone], [address]  [facility] <Fee based>: [address], [facility], [phone]

## 2018-12-16 NOTE — ASSESSMENT & PLAN NOTE
Patient with WBC 17.38 on admission and lactate of 3.2 likely in setting of s/p cardiac arrest. Both have trended down to within normal limits. BC ordered NGTD. CXR shows no acute process. UA does not suggest UTI. Started on vanc and zosyn on admssion, but this was discontinued as infectious etiology is less likely.

## 2018-12-16 NOTE — PROGRESS NOTES
[facility]  Cardiac Electrophysiology  Progress Note    Admission Date: 12/14/2018  Code Status: Full Code   Attending Physician: Polo Patel MD   Principal Problem:Torsades de pointes    Subjective:     Interval History: Yesterday, after pt was extubated, runs of PMVT seen on telemetry. Degenerated to TdP. Pt s/p DCCV x1. Started on IV lidocaine drip and IV MTP. Seen by psychiatry, pt endorsed SI so sitter placed. This AM, pt reports neck pain at the central line site in RIJ.     Review of Systems   Constitution: Negative for chills, fever, weakness and malaise/fatigue.   Cardiovascular: Positive for irregular heartbeat. Negative for chest pain, leg swelling, near-syncope, orthopnea, palpitations and paroxysmal nocturnal dyspnea.   Respiratory: Negative for cough and shortness of breath.    Gastrointestinal: Positive for abdominal pain. Negative for nausea and vomiting.   Neurological: Negative for dizziness, focal weakness, headaches and light-headedness.     Objective:     Vital Signs (Most Recent):  Temp: 98.9 °F (37.2 °C) (12/16/18 0800)  Pulse: 91 (12/16/18 1000)  Resp: (!) 33 (12/16/18 1000)  BP: 136/68 (12/16/18 1000)  SpO2: 100 % (12/16/18 1000) Vital Signs (24h Range):  Temp:  [98.6 °F (37 °C)-99.2 °F (37.3 °C)] 98.9 °F (37.2 °C)  Pulse:  [] 91  Resp:  [6-52] 33  SpO2:  [94 %-100 %] 100 %  BP: (111-230)/() 136/68     Weight: 72.6 kg (160 lb)  Body mass index is 22.96 kg/m².     SpO2: 100 %  O2 Device (Oxygen Therapy): room air    Physical Exam   Constitutional: He is oriented to person, place, and time.   HENT:   Mouth/Throat: Oropharynx is clear and moist.   Eyes: Pupils are equal, round, and reactive to light.   Neck: Neck supple. No JVD present.   Cardiovascular: Normal rate.   Pulmonary/Chest: Effort normal.   Abdominal: Soft.   Musculoskeletal: He exhibits no edema.   Neurological: He is alert and oriented to person, place, and time.   Skin: Skin is warm and  dry.       Significant Labs: All pertinent lab results from the last 24 hours have been reviewed.    Significant Imaging: Echocardiogram: 2D echo with color flow doppler: No results found for this or any previous visit.    Assessment and Plan:     Sudden cardiac arrest    -S/p two cardiac arrests (initial one on 12/14 and then on 12/15)  -TdP seen on telemetry as both causes of cardiac arrest (PVC on prolonged QT interval, R on T phenomenon)   -TTE showed EF 20 %, has NICM (?Of PVC induced CM vs substance induced CM)  -LHC showed normal coronaries  -EKG shows prolonged QTc interval however UDS positive for BZD, opiate, MJ and cocaine  -recommend continuing IV lidocaine drip at 1 mg/min tonight and then weaning to 0.5 mg/min tomorrow AM  -recommend toxicology consult and further investigational studies to evaluate for other substance abuse (TCA's, sedatives, etc)   -discussed with pt and family, recommend Life Vest on hospital discharge and repeat TTE O/P in 6 weeks, at the time pt will follow up with Cardiology clinic to determine if EF improved/also UDS will need to be repeated, and discussion if ICD implantation is needed  -keep Magnesium >2 , potassium >4  -EP team will continue to follow      Marlene Al MD  Cardiac Electrophysiology  [facility]

## 2018-12-16 NOTE — PROGRESS NOTES
"[facility]  Cardiology  Progress Note    Patient Name: Reggie To  MRN: [id number]  Admission Date: 12/14/2018  Hospital Length of Stay: 1 days  Code Status: Full Code   Attending Physician: Polo Patel MD   Primary Care Physician: No primary care provider on file.  Expected Discharge Date:   Principal Problem:Torsades de pointes    Subjective:     Hospital Course:   The next day, patient was successful extubated to room air. He did code in the later morning (see significant event note) where he became unresponsive and became hemodynamically unstable. He underwent less than 1 round of compressions and was successfully cardioverted. He had persistent PVCs on telemetry afterwards. Event was discussed with Dr. Barton, and it was agreed to start lidocaine drip as amiodarone prolongs the QTc (previous EKGs showed QTc 600-700). Metoprolol 5mg IV Q6H was also begun. Central line was place for medication administration.     Psychiatry was consulted as patient endorsed suicidal ideation, saying he wanted to leave AMA to "finish the job" (of killing himself). Patient was PECed, and bedside sitter and suicidal precautions were put in place. Psychiatry will evaluate patient tomorrow.    Interval History: This morning, patient was successful extubated to room air. He did code in the later morning (see significant event note) where he became unresponsive and became hemodynamically unstable. He underwent less than 1 round of compressions and was successfully cardioverted. He had persistent PVCs on telemetry afterwards. Event was discussed with Dr. Barton, and it was agreed to start lidocaine drip as amiodarone prolongs the QTc (previous EKGs showed QTc 600-700). Metoprolol 5mg IV Q6H was also begun. Central line was place for medication administration.     Psychiatry was consulted as patient endorsed suicidal ideation, saying he wanted to leave AMA to "finish the job" (of killing himself). Patient " was PECed, and bedside sitter and suicidal precautions were put in place. Psychiatry will evaluate patient tomorrow.    Review of Systems   Constitution: Negative for chills and fever.   Gastrointestinal: Positive for abdominal pain. Negative for nausea and vomiting.     Objective:     Vital Signs (Most Recent):  Temp: 98.6 °F (37 °C) (12/15/18 1500)  Pulse: 101 (12/15/18 1735)  Resp: (!) 38 (12/15/18 1735)  BP: 125/73 (12/15/18 1735)  SpO2: 99 % (12/15/18 1735) Vital Signs (24h Range):  Temp:  [97 °F (36.1 °C)-98.6 °F (37 °C)] 98.6 °F (37 °C)  Pulse:  [] 101  Resp:  [9-52] 38  SpO2:  [94 %-100 %] 99 %  BP: ()/() 125/73     Weight: 72.6 kg (160 lb)  Body mass index is 22.96 kg/m².     SpO2: 99 %  O2 Device (Oxygen Therapy): room air      Intake/Output Summary (Last 24 hours) at 12/15/2018 1822  Last data filed at 12/15/2018 1700  Gross per 24 hour   Intake 1940.24 ml   Output 1810 ml   Net 130.24 ml       Lines/Drains/Airways     Central Venous Catheter Line                 Percutaneous Central Line Insertion/Assessment - triple lumen  12/15/18 1703 right internal jugular less than 1 day          Peripheral Intravenous Line                 Peripheral IV - Single Lumen 12/14/18 1440 Left Antecubital 1 day                Physical Exam   Constitutional: He appears well-developed.   Eyes: Pupils are equal, round, and reactive to light.   Neck: Neck supple.   Cardiovascular: Normal rate and regular rhythm.   Pulmonary/Chest: Effort normal. No respiratory distress.   Abdominal: Soft. He exhibits no distension. There is tenderness (generalized).   Musculoskeletal: He exhibits no edema.   Neurological: He is alert.   Skin: Skin is warm.   Psychiatric:   Becomes agitated at times when talking to family. Per RN, was endorsing SI upon extubation     Significant Labs:   CMP   Recent Labs   Lab 12/14/18  1051 12/14/18  1544 12/15/18  0437 12/15/18  1354    141 143 144   K 3.9 4.4 3.8 3.2*    108 110  111*   CO2 21* 23 23 21*   * 121* 80 101   BUN 14 15 16 16   CREATININE 1.2 1.2 1.1 1.3   CALCIUM 11.0* 10.3 9.6 9.7   PROT 8.8* 8.4 7.0  --    ALBUMIN 4.6 4.4 3.8  --    BILITOT 1.0 1.2* 0.9  --    ALKPHOS 122 120 94  --    AST 20 97* 89*  --    ALT 18 78* 57*  --    ANIONGAP 14 10 10 12   ESTGFRAFRICA >60.0 >60.0 >60.0 >60.0   EGFRNONAA >60.0 >60.0 >60.0 >60.0   , CBC   Recent Labs   Lab 12/14/18  1051  12/15/18  0437   WBC 17.38*  --  11.63   HGB 14.9  --  12.6*   HCT 44.4   < > 38.4*   *  --  311    < > = values in this interval not displayed.    and Troponin   Recent Labs   Lab 12/14/18  1544 12/14/18  2217 12/15/18  0437   TROPONINI 0.249* 0.392* 0.259*       Significant Imaging:  TTE Complete (12/14/18):   · Severely decreased left ventricular systolic function. The estimated ejection fraction is 20%  · Mildly to moderately reduced right ventricular systolic function.  · Grade I (mild) left ventricular diastolic dysfunction consistent with impaired relaxation.    Assessment and Plan:     Brief HPI: 23M s/p cardiac arrest likely due to drug-induced cardiomyopathy with viral or congential etiology in differentials; still with PVCs on IV metoprolol and lidocaine drip    * Torsades de pointes    Patient initially presented with abdominal pain but went into PEA arrest and Torsade s/p DCCV and 15 minutes of CPR before ROSC. He received 4g of Magnesium, and a [facility] ruled out CAD. He was intubated, sedated, and admitted to CCU. Extubated today to room air. UDS showed presumptive positive for THC, cocaine, BZDs, opioids. 2D ECHO showed EF 20%.     Plan:  - Spoke with EP, who advised to begin metoprolol 5mg IV Q6H and lidocaine drip  - Monitor daily CMP/Mg with goal to keep Mg>2, K+>4  - EKGs PRN  - Will try to obtain medical records from [place] regarding cardiac and medical history           Acute on chronic systolic heart failure    No known history of CHF with most recent 2D ECHO showing EF 20%.   -  "Started on lisinopril 5mg      Sudden cardiac arrest    Please refer to "torsades de pointes".     Acute respiratory failure with hypercapnia    Patient was intubated for airway concern s/p cardiac arrest on admission. Successfully extubated to RA on 12/15.      Hyperglycemia    POCT peak at 132, likely stress induced. No history of diabetes per wife and family. Has improved. Will continue POCT glucose checks 4x daily and adjust as needed.     Lactic acidosis    Please see "leukocytosis"     Leukocytosis    Patient with WBC 17.38 on admission and lactate of 3.2 likely in setting of s/p cardiac arrest. Both have trended down to within normal limits. BC ordered NGTD. CXR shows no acute process. UA does not suggest UTI. Started on vanc and zosyn on admssion, but this was discontinued as infectious etiology is less likely.      Generalized abdominal pain    Patient initially presented with generalized abdominal pain with nausea and vomiting. He has a history of "bleeding ulcers". Abdominal x-ray showed no bowel distension, intramural gas, intra-portal gas or free peritoneal gas, however overlying the medial aspect of the gastric fundus is a 5 mm irregularly marginated calcification. Differentials include intra-abdominal infectious etiology vs perforated ulcer vs diverticulitis. Started on vancomycin and zosyn on admission, which were discontinued today. CT abdomen was negative for bleed.    Plan:  - Consider surgical consult as patient initially presented with abdominal pain and continues to endorse upon extubation.         VTE Risk Mitigation (From admission, onward)        Ordered     enoxaparin injection 40 mg  Daily      12/15/18 1001          Venus Christianson MD PGY1  Cardiology  [facility]  "

## 2018-12-16 NOTE — SUBJECTIVE & OBJECTIVE
"Interval History: This morning, patient was successful extubated to room air. He did code in the later morning (see significant event note) where he became unresponsive and became hemodynamically unstable. He underwent less than 1 round of compressions and was successfully cardioverted. He had persistent PVCs on telemetry afterwards. Event was discussed with Dr. Barton, and it was agreed to start lidocaine drip as amiodarone prolongs the QTc (previous EKGs showed QTc 600-700). Metoprolol 5mg IV Q6H was also begun. Central line was place for medication administration.     Psychiatry was consulted as patient endorsed suicidal ideation, saying he wanted to leave AMA to "finish the job" (of killing himself). Patient was PECed, and bedside sitter and suicidal precautions were put in place. Psychiatry will evaluate patient tomorrow.    Review of Systems   Constitution: Negative for chills and fever.   Gastrointestinal: Positive for abdominal pain. Negative for nausea and vomiting.     Objective:     Vital Signs (Most Recent):  Temp: 98.6 °F (37 °C) (12/15/18 1500)  Pulse: 101 (12/15/18 1735)  Resp: (!) 38 (12/15/18 1735)  BP: 125/73 (12/15/18 1735)  SpO2: 99 % (12/15/18 1735) Vital Signs (24h Range):  Temp:  [97 °F (36.1 °C)-98.6 °F (37 °C)] 98.6 °F (37 °C)  Pulse:  [] 101  Resp:  [9-52] 38  SpO2:  [94 %-100 %] 99 %  BP: ()/() 125/73     Weight: 72.6 kg (160 lb)  Body mass index is 22.96 kg/m².     SpO2: 99 %  O2 Device (Oxygen Therapy): room air      Intake/Output Summary (Last 24 hours) at 12/15/2018 1822  Last data filed at 12/15/2018 1700  Gross per 24 hour   Intake 1940.24 ml   Output 1810 ml   Net 130.24 ml       Lines/Drains/Airways     Central Venous Catheter Line                 Percutaneous Central Line Insertion/Assessment - triple lumen  12/15/18 1703 right internal jugular less than 1 day          Peripheral Intravenous Line                 Peripheral IV - Single Lumen 12/14/18 1440 Left " Antecubital 1 day                Physical Exam   Constitutional: He appears well-developed.   Eyes: Pupils are equal, round, and reactive to light.   Neck: Neck supple.   Cardiovascular: Normal rate and regular rhythm.   Pulmonary/Chest: Effort normal. No respiratory distress.   Abdominal: Soft. He exhibits no distension. There is tenderness (generalized).   Musculoskeletal: He exhibits no edema.   Neurological: He is alert.   Skin: Skin is warm.   Psychiatric:   Becomes agitated at times when talking to family. Per RN, was endorsing SI upon extubation     Significant Labs:   CMP   Recent Labs   Lab 12/14/18  1051 12/14/18  1544 12/15/18  0437 12/15/18  1354    141 143 144   K 3.9 4.4 3.8 3.2*    108 110 111*   CO2 21* 23 23 21*   * 121* 80 101   BUN 14 15 16 16   CREATININE 1.2 1.2 1.1 1.3   CALCIUM 11.0* 10.3 9.6 9.7   PROT 8.8* 8.4 7.0  --    ALBUMIN 4.6 4.4 3.8  --    BILITOT 1.0 1.2* 0.9  --    ALKPHOS 122 120 94  --    AST 20 97* 89*  --    ALT 18 78* 57*  --    ANIONGAP 14 10 10 12   ESTGFRAFRICA >60.0 >60.0 >60.0 >60.0   EGFRNONAA >60.0 >60.0 >60.0 >60.0   , CBC   Recent Labs   Lab 12/14/18  1051  12/15/18  0437   WBC 17.38*  --  11.63   HGB 14.9  --  12.6*   HCT 44.4   < > 38.4*   *  --  311    < > = values in this interval not displayed.    and Troponin   Recent Labs   Lab 12/14/18  1544 12/14/18  2217 12/15/18  0437   TROPONINI 0.249* 0.392* 0.259*       Significant Imaging:  TTE Complete (12/14/18):   · Severely decreased left ventricular systolic function. The estimated ejection fraction is 20%  · Mildly to moderately reduced right ventricular systolic function.  · Grade I (mild) left ventricular diastolic dysfunction consistent with impaired relaxation.

## 2018-12-16 NOTE — ASSESSMENT & PLAN NOTE
Patient was intubated for airway concern s/p cardiac arrest on admission. Successfully extubated to RA on 12/15.

## 2018-12-16 NOTE — ASSESSMENT & PLAN NOTE
POCT peak at 132, likely stress induced. No history of diabetes per wife and family. Has improved. Will continue POCT glucose checks 4x daily and adjust as needed.

## 2018-12-19 LAB
BACTERIA BLD CULT: NORMAL
BACTERIA BLD CULT: NORMAL

## (undated) DEVICE — CATH INFINITI JUDKINS JR4

## (undated) DEVICE — PACK CATH LAB

## (undated) DEVICE — GUIDEWIRE EMERALD 150CM PTFE

## (undated) DEVICE — SHEATH INTRODUCER 6FR 11CM

## (undated) DEVICE — OMNIPAQUE 350 200ML

## (undated) DEVICE — CATH INFINITI 4F JL4 .042X100

## (undated) DEVICE — DEVICE PERCLOSE SUT CLSR 6FR

## (undated) DEVICE — KIT MICROINTRO 4F .018X40X7CM

## (undated) DEVICE — SEE MEDLINE ITEM 156894